# Patient Record
Sex: FEMALE | Race: WHITE | NOT HISPANIC OR LATINO | Employment: OTHER | URBAN - METROPOLITAN AREA
[De-identification: names, ages, dates, MRNs, and addresses within clinical notes are randomized per-mention and may not be internally consistent; named-entity substitution may affect disease eponyms.]

---

## 2020-08-27 ENCOUNTER — TRANSCRIBE ORDERS (OUTPATIENT)
Dept: ADMINISTRATIVE | Facility: HOSPITAL | Age: 65
End: 2020-08-27

## 2020-08-27 DIAGNOSIS — Z12.31 SCREENING MAMMOGRAM FOR HIGH-RISK PATIENT: Primary | ICD-10-CM

## 2020-09-02 ENCOUNTER — HOSPITAL ENCOUNTER (OUTPATIENT)
Dept: MAMMOGRAPHY | Facility: HOSPITAL | Age: 65
Discharge: HOME | End: 2020-09-02
Attending: SPECIALIST
Payer: COMMERCIAL

## 2020-09-02 VITALS — HEIGHT: 68 IN | BODY MASS INDEX: 21.98 KG/M2 | WEIGHT: 145 LBS

## 2020-09-02 DIAGNOSIS — Z12.31 SCREENING MAMMOGRAM FOR HIGH-RISK PATIENT: ICD-10-CM

## 2020-09-02 PROCEDURE — 77063 BREAST TOMOSYNTHESIS BI: CPT

## 2020-09-02 PROCEDURE — 77067 SCR MAMMO BI INCL CAD: CPT

## 2020-10-03 ENCOUNTER — OFFICE VISIT (OUTPATIENT)
Dept: URGENT CARE | Facility: CLINIC | Age: 65
End: 2020-10-03
Payer: COMMERCIAL

## 2020-10-03 VITALS
BODY MASS INDEX: 21.98 KG/M2 | RESPIRATION RATE: 16 BRPM | HEIGHT: 68 IN | HEART RATE: 79 BPM | SYSTOLIC BLOOD PRESSURE: 125 MMHG | WEIGHT: 145 LBS | DIASTOLIC BLOOD PRESSURE: 70 MMHG | OXYGEN SATURATION: 98 % | TEMPERATURE: 98.2 F

## 2020-10-03 DIAGNOSIS — T63.441A BEE STING, ACCIDENTAL OR UNINTENTIONAL, INITIAL ENCOUNTER: Primary | ICD-10-CM

## 2020-10-03 PROCEDURE — G0381 LEV 2 HOSP TYPE B ED VISIT: HCPCS | Performed by: PHYSICIAN ASSISTANT

## 2020-10-03 PROCEDURE — 99282 EMERGENCY DEPT VISIT SF MDM: CPT | Performed by: PHYSICIAN ASSISTANT

## 2020-10-03 RX ORDER — PREDNISONE 20 MG/1
40 TABLET ORAL DAILY
Qty: 6 TABLET | Refills: 0 | Status: SHIPPED | OUTPATIENT
Start: 2020-10-03 | End: 2020-10-06

## 2021-03-31 DIAGNOSIS — Z23 ENCOUNTER FOR IMMUNIZATION: ICD-10-CM

## 2021-07-27 ENCOUNTER — OFFICE VISIT (OUTPATIENT)
Dept: URGENT CARE | Facility: CLINIC | Age: 66
End: 2021-07-27
Payer: MEDICARE

## 2021-07-27 VITALS
RESPIRATION RATE: 16 BRPM | OXYGEN SATURATION: 99 % | SYSTOLIC BLOOD PRESSURE: 132 MMHG | DIASTOLIC BLOOD PRESSURE: 61 MMHG | HEART RATE: 65 BPM | TEMPERATURE: 97.4 F

## 2021-07-27 DIAGNOSIS — T63.444A BEE STING REACTION, UNDETERMINED INTENT, INITIAL ENCOUNTER: Primary | ICD-10-CM

## 2021-07-27 PROCEDURE — G0463 HOSPITAL OUTPT CLINIC VISIT: HCPCS | Performed by: PHYSICIAN ASSISTANT

## 2021-07-27 PROCEDURE — 99213 OFFICE O/P EST LOW 20 MIN: CPT | Performed by: PHYSICIAN ASSISTANT

## 2021-07-27 RX ORDER — CEPHALEXIN 500 MG/1
500 CAPSULE ORAL EVERY 6 HOURS SCHEDULED
Qty: 28 CAPSULE | Refills: 0 | Status: SHIPPED | OUTPATIENT
Start: 2021-07-27 | End: 2021-08-03

## 2021-07-27 NOTE — PATIENT INSTRUCTIONS
Insect Bite or Sting   WHAT YOU NEED TO KNOW:   Most insect bites and stings are not dangerous and go away without treatment  Your symptoms may be mild, or you may develop anaphylaxis  Anaphylaxis is a sudden, life-threatening reaction that needs immediate treatment  Common examples of insects that bite or sting are bees, ticks, mosquitoes, spiders, and ants  Insect bites or stings can lead to diseases such as malaria, West Nile virus, Lyme disease, or [place] Spotted Fever  DISCHARGE INSTRUCTIONS:   Call your local emergency number (911 in the [address] [facility]) for signs or symptoms of anaphylaxis,  such as trouble breathing, swelling in your mouth or throat, or wheezing  You may also have itching, a rash, hives, or feel like you are going to faint  Return to the emergency department if:   · You are stung on your tongue or in your throat  · A white area forms around the bite  · You are sweating badly or have body pain  · You think you were bitten or stung by a poisonous insect  Call your doctor if:   · You have a fever  · The area becomes red, warm, tender, and swollen beyond the area of the bite or sting  · You have questions or concerns about your condition or care  Medicines: You may need any of the following:  · Antihistamines  decrease itching and rash  · Epinephrine  is used to treat severe allergic reactions such as anaphylaxis  · Take your medicine as directed  Contact your healthcare provider if you think your medicine is not helping or if you have side effects  Tell him of her if you are allergic to any medicine  Keep a list of the medicines, vitamins, and herbs you take  Include the amounts, and when and why you take them  Bring the list or the pill bottles to follow-up visits  Carry your medicine list with you in case of an emergency  Steps to take for signs or symptoms of anaphylaxis:   · Immediately  give 1 shot of epinephrine only into the outer thigh muscle  · Leave the shot in place  as directed  Your healthcare provider may recommend you leave it in place for up to 10 seconds before you remove it  This helps make sure all of the epinephrine is delivered  · Call 911 and go to the emergency department,  even if the shot improved symptoms  Do not drive yourself  Bring the used epinephrine shot with you  Safety precautions to take if you are at risk for anaphylaxis:   · Keep 2 shots of epinephrine with you at all times  You may need a second shot, because epinephrine only works for about 20 minutes and symptoms may return  Your healthcare provider can show you and family members how to give the shot  Check the expiration date every month and replace it before it expires  · Create an action plan  Your healthcare provider can help you create a written plan that explains the allergy and an emergency plan to treat a reaction  The plan explains when to give a second epinephrine shot if symptoms return or do not improve after the first  Give copies of the action plan and emergency instructions to family members, work and school staff, and  providers  Show them how to give a shot of epinephrine  · Carry medical alert identification  Wear medical alert jewelry or carry a card that says you have an insect allergy  Ask your healthcare provider where to get these items  If an insect bites or stings you:   · Remove the stinger  Scrape the stinger out with your fingernail, edge of a credit card, or a knife blade  Do not squeeze the wound  Gently wash the area with soap and water  · Remove the tick  Ticks must be removed as soon as possible so you do not get diseases passed through tick bites  Ask your healthcare provider for more information on tick bites and how to remove ticks  Care for a bite or sting wound:   · Elevate (raise) the area above the level of your heart, if possible  Prop the area on pillows to keep it raised comfortably  Elevate the area for 10 to 20 minutes each hour or as directed by your healthcare provider  · Use compresses  Soak a clean washcloth in cold water, wring it out, and put it on the bite or sting  Use the compress for 10 to 20 minutes each hour or as directed by your healthcare provider  After 24 to 48 hours, change to warm compresses  · Apply a paste  Add water to baking soda to make a thick paste  Put the paste on the area for 5 minutes  Rinse gently to remove the paste  Prevent another insect bite or sting:   · Do not wear bright-colored or flower-print clothing when you plan to spend time outdoors  Do not use hairspray, perfumes, or aftershave  · Do not leave food out  · Empty any standing water and wash container with soap and water every 2 days  · Put screens on all open windows and doors  · Put insect repellent that contains DEET on skin that is showing when you go outside  Put insect repellent at the top of your boots, bottom of pant legs, and sleeve cuffs  Wear long sleeves, pants, and shoes  · Use citronella candles outdoors to help keep mosquitoes away  Put a tick and flea collar on pets  Follow up with your doctor as directed:  Write down your questions so you remember to ask them during your visits  © Copyright [employer] 2021 Information is for End User's use only and may not be sold, redistributed or otherwise used for commercial purposes  All illustrations and images included in CareNotes® are the copyrighted property of [employer]  or [facility]   The above information is an  only  It is not intended as medical advice for individual conditions or treatments  Talk to your doctor, nurse or pharmacist before following any medical regimen to see if it is safe and effective for you

## 2021-07-27 NOTE — PROGRESS NOTES
[facility]        NAME: Sheridan Gan is a 73 y o  female  : 1955    MRN: [id number]  DATE: 2021  TIME: 10:45 AM    Assessment and Plan   Bee sting reaction, undetermined intent, initial encounter [T63 [id number]]  1  Bee sting reaction, undetermined intent, initial encounter  cephalexin (KEFLEX) 500 mg capsule         Patient Instructions   Patient Instructions     Insect Bite or Sting   WHAT YOU NEED TO KNOW:   Most insect bites and stings are not dangerous and go away without treatment  Your symptoms may be mild, or you may develop anaphylaxis  Anaphylaxis is a sudden, life-threatening reaction that needs immediate treatment  Common examples of insects that bite or sting are bees, ticks, mosquitoes, spiders, and ants  Insect bites or stings can lead to diseases such as malaria, West Nile virus, Lyme disease, or [place] Spotted Fever  DISCHARGE INSTRUCTIONS:   Call your local emergency number (911 in the [address] [place] [address],[address]) for signs or symptoms of anaphylaxis,  such as trouble breathing, swelling in your mouth or throat, or wheezing  You may also have itching, a rash, hives, or feel like you are going to faint  Return to the emergency department if:   · You are stung on your tongue or in your throat  · A white area forms around the bite  · You are sweating badly or have body pain  · You think you were bitten or stung by a poisonous insect  Call your doctor if:   · You have a fever  · The area becomes red, warm, tender, and swollen beyond the area of the bite or sting  · You have questions or concerns about your condition or care  Medicines: You may need any of the following:  · Antihistamines  decrease itching and rash  · Epinephrine  is used to treat severe allergic reactions such as anaphylaxis  · Take your medicine as directed  Contact your healthcare provider if you think your medicine is not helping or if you have side effects   Tell him of her if you are allergic to any medicine  Keep a list of the medicines, vitamins, and herbs you take  Include the amounts, and when and why you take them  Bring the list or the pill bottles to follow-up visits  Carry your medicine list with you in case of an emergency  Steps to take for signs or symptoms of anaphylaxis:   · Immediately  give 1 shot of epinephrine only into the outer thigh muscle  · Leave the shot in place  as directed  Your healthcare provider may recommend you leave it in place for up to 10 seconds before you remove it  This helps make sure all of the epinephrine is delivered  · Call 911 and go to the emergency department,  even if the shot improved symptoms  Do not drive yourself  Bring the used epinephrine shot with you  Safety precautions to take if you are at risk for anaphylaxis:   · Keep 2 shots of epinephrine with you at all times  You may need a second shot, because epinephrine only works for about 20 minutes and symptoms may return  Your healthcare provider can show you and family members how to give the shot  Check the expiration date every month and replace it before it expires  · Create an action plan  Your healthcare provider can help you create a written plan that explains the allergy and an emergency plan to treat a reaction  The plan explains when to give a second epinephrine shot if symptoms return or do not improve after the first  Give copies of the action plan and emergency instructions to family members, work and school staff, and  providers  Show them how to give a shot of epinephrine  · Carry medical alert identification  Wear medical alert jewelry or carry a card that says you have an insect allergy  Ask your healthcare provider where to get these items  If an insect bites or stings you:   · Remove the stinger  Scrape the stinger out with your fingernail, edge of a credit card, or a knife blade  Do not squeeze the wound   Gently wash the area with soap and water     · Remove the tick  Ticks must be removed as soon as possible so you do not get diseases passed through tick bites  Ask your healthcare provider for more information on tick bites and how to remove ticks  Care for a bite or sting wound:   · Elevate (raise) the area above the level of your heart, if possible  Prop the area on pillows to keep it raised comfortably  Elevate the area for 10 to 20 minutes each hour or as directed by your healthcare provider  · Use compresses  Soak a clean washcloth in cold water, wring it out, and put it on the bite or sting  Use the compress for 10 to 20 minutes each hour or as directed by your healthcare provider  After 24 to 48 hours, change to warm compresses  · Apply a paste  Add water to baking soda to make a thick paste  Put the paste on the area for 5 minutes  Rinse gently to remove the paste  Prevent another insect bite or sting:   · Do not wear bright-colored or flower-print clothing when you plan to spend time outdoors  Do not use hairspray, perfumes, or aftershave  · Do not leave food out  · Empty any standing water and wash container with soap and water every 2 days  · Put screens on all open windows and doors  · Put insect repellent that contains DEET on skin that is showing when you go outside  Put insect repellent at the top of your boots, bottom of pant legs, and sleeve cuffs  Wear long sleeves, pants, and shoes  · Use citronella candles outdoors to help keep mosquitoes away  Put a tick and flea collar on pets  Follow up with your doctor as directed:  Write down your questions so you remember to ask them during your visits  © Copyright [employer] 2021 Information is for End User's use only and may not be sold, redistributed or otherwise used for commercial purposes   All illustrations and images included in CareNotes® are the copyrighted property of [employer]  or Chris Palacios  The above information is an  only  It is not intended as medical advice for individual conditions or treatments  Talk to your doctor, nurse or pharmacist before following any medical regimen to see if it is safe and effective for you  Follow up with PCP in 3-5 days  Proceed to  ER if symptoms worsen  Chief Complaint     Chief Complaint   Patient presents with   Sherill Button Sting     Sunday pt was stung by a bee on her right foot  History of Present Illness       -The patient states she was walking without shoes on Sunday when she stepped on a bee  -She was stung  In the right plantar foot  -She c/o increased pain, swelling, redness  -She denies associated fever or chills  -She denies bee allergy  -Denies facial swelling, lips swelling, tongue swelling, CP or SOB  -Had similar symptoms last October and needed Prednisone  -She took two Benadryl last night    -She states swelling and redness has  Progressively become worse  Review of Systems   Review of Systems   Constitutional: Negative for chills and fever  HENT: Positive for sore throat  Eyes: Negative for pain and visual disturbance  Respiratory: Negative for cough and shortness of breath  Cardiovascular: Negative for chest pain and palpitations  Gastrointestinal: Negative for abdominal pain and vomiting  Genitourinary: Negative for dysuria and hematuria  Musculoskeletal: Negative for arthralgias and back pain  Skin: Positive for color change  Negative for rash  Neurological: Negative for seizures and syncope  All other systems reviewed and are negative          Current Medications       Current Outpatient Medications:     cephalexin (KEFLEX) 500 mg capsule, Take 1 capsule (500 mg total) by mouth every 6 (six) hours for 7 days, Disp: 28 capsule, Rfl: 0    Current Allergies     Allergies as of 07/27/2021 - Reviewed 07/27/2021   Allergen Reaction Noted    Tetracycline Rash 09/02/2020            The following portions of the patient's history were reviewed and updated as appropriate: allergies, current medications, past family history, past medical history, past social history, past surgical history and problem list      Past Medical History:   Diagnosis Date    BRCA1 negative     BRCA2 negative        Past Surgical History:   Procedure Laterality Date    BREAST BIOPSY Bilateral benign 20yrs ago       Family History   Problem Relation Age of Onset    Breast cancer Mother 76    No Known Problems Maternal Grandmother     No Known Problems Paternal Grandmother     No Known Problems Maternal Aunt          Medications have been verified  Objective   /61   Pulse 65   Temp (!) 97 4 °F (36 3 °C)   Resp 16   SpO2 99%        Physical Exam     Physical Exam  Constitutional:       Appearance: She is well-developed  She is not diaphoretic  HENT:      Head: Normocephalic  Eyes:      General:         Right eye: No discharge  Left eye: No discharge  Pupils: Pupils are equal, round, and reactive to light  Neck:      Thyroid: No thyromegaly  Cardiovascular:      Rate and Rhythm: Normal rate  Heart sounds: No murmur heard  Pulmonary:      Effort: Pulmonary effort is normal  No respiratory distress  Breath sounds: No wheezing or rales  Chest:      Chest wall: No tenderness  Abdominal:      General: There is no distension  Palpations: Abdomen is soft  Tenderness: There is no abdominal tenderness  There is no guarding or rebound  Musculoskeletal:         General: Normal range of motion  Cervical back: Normal range of motion  Lymphadenopathy:      Cervical: No cervical adenopathy  Skin:     Comments: There is a bee sting injury noted right right plantar foot at the 1st metatarsal   There is associated ecchymosis and warmth  No streaking noted      Neurological:      Mental Status: She is alert and oriented to person, place, and time            -This is likely bee sting reaction with secondary infection   I suggest supportive tx including warm compresses and antihistamine

## 2021-11-01 ENCOUNTER — HOSPITAL ENCOUNTER (OUTPATIENT)
Dept: MAMMOGRAPHY | Facility: HOSPITAL | Age: 66
Discharge: HOME | End: 2021-11-01
Attending: SPECIALIST
Payer: MEDICARE

## 2021-11-01 ENCOUNTER — HOSPITAL ENCOUNTER (OUTPATIENT)
Dept: BONE DENSITY | Facility: HOSPITAL | Age: 66
Discharge: HOME | End: 2021-11-01
Attending: SPECIALIST
Payer: MEDICARE

## 2021-11-01 VITALS — HEIGHT: 68 IN | BODY MASS INDEX: 21.67 KG/M2 | WEIGHT: 143 LBS

## 2021-11-01 DIAGNOSIS — Z13.820 ENCOUNTER FOR SCREENING FOR OSTEOPOROSIS: ICD-10-CM

## 2021-11-01 DIAGNOSIS — Z12.31 ENCOUNTER FOR SCREENING MAMMOGRAM FOR MALIGNANT NEOPLASM OF BREAST: ICD-10-CM

## 2021-11-01 PROCEDURE — 77067 SCR MAMMO BI INCL CAD: CPT

## 2021-11-01 PROCEDURE — 77063 BREAST TOMOSYNTHESIS BI: CPT

## 2021-11-01 PROCEDURE — 77080 DXA BONE DENSITY AXIAL: CPT

## 2022-01-05 ENCOUNTER — APPOINTMENT (OUTPATIENT)
Dept: LAB | Facility: HOSPITAL | Age: 67
End: 2022-01-05
Attending: INTERNAL MEDICINE
Payer: MEDICARE

## 2022-01-05 DIAGNOSIS — R53.83 FATIGUE, UNSPECIFIED TYPE: ICD-10-CM

## 2022-01-05 DIAGNOSIS — R73.01 IMPAIRED FASTING GLUCOSE: ICD-10-CM

## 2022-01-05 DIAGNOSIS — E78.5 HYPERLIPIDEMIA, UNSPECIFIED HYPERLIPIDEMIA TYPE: ICD-10-CM

## 2022-01-05 DIAGNOSIS — E55.9 AVITAMINOSIS D: ICD-10-CM

## 2022-01-05 DIAGNOSIS — M25.50 PAIN IN JOINT, MULTIPLE SITES: ICD-10-CM

## 2022-01-05 DIAGNOSIS — M81.0 SENILE OSTEOPOROSIS: ICD-10-CM

## 2022-01-05 LAB
25(OH)D3 SERPL-MCNC: 21.2 NG/ML (ref 30–100)
ALBUMIN SERPL BCP-MCNC: 3.7 G/DL (ref 3.5–5)
ALP SERPL-CCNC: 81 U/L (ref 46–116)
ALT SERPL W P-5'-P-CCNC: 24 U/L (ref 12–78)
ANION GAP SERPL CALCULATED.3IONS-SCNC: 3 MMOL/L (ref 4–13)
AST SERPL W P-5'-P-CCNC: 15 U/L (ref 5–45)
BASOPHILS # BLD AUTO: 0.06 THOUSANDS/ΜL (ref 0–0.1)
BASOPHILS NFR BLD AUTO: 1 % (ref 0–1)
BILIRUB SERPL-MCNC: 0.81 MG/DL (ref 0.2–1)
BUN SERPL-MCNC: 17 MG/DL (ref 5–25)
CALCIUM SERPL-MCNC: 9.3 MG/DL (ref 8.3–10.1)
CHLORIDE SERPL-SCNC: 107 MMOL/L (ref 100–108)
CHOLEST SERPL-MCNC: 234 MG/DL
CO2 SERPL-SCNC: 30 MMOL/L (ref 21–32)
CREAT SERPL-MCNC: 0.66 MG/DL (ref 0.6–1.3)
EOSINOPHIL # BLD AUTO: 0.25 THOUSAND/ΜL (ref 0–0.61)
EOSINOPHIL NFR BLD AUTO: 3 % (ref 0–6)
ERYTHROCYTE [DISTWIDTH] IN BLOOD BY AUTOMATED COUNT: 12.2 % (ref 11.6–15.1)
EST. AVERAGE GLUCOSE BLD GHB EST-MCNC: 103 MG/DL
GFR SERPL CREATININE-BSD FRML MDRD: 92 ML/MIN/1.73SQ M
GLUCOSE P FAST SERPL-MCNC: 85 MG/DL (ref 65–99)
HBA1C MFR BLD: 5.2 %
HCT VFR BLD AUTO: 45.4 % (ref 34.8–46.1)
HDLC SERPL-MCNC: 99 MG/DL
HGB BLD-MCNC: 14.5 G/DL (ref 11.5–15.4)
IMM GRANULOCYTES # BLD AUTO: 0.03 THOUSAND/UL (ref 0–0.2)
IMM GRANULOCYTES NFR BLD AUTO: 0 % (ref 0–2)
LDLC SERPL CALC-MCNC: 120 MG/DL (ref 0–100)
LYMPHOCYTES # BLD AUTO: 1.92 THOUSANDS/ΜL (ref 0.6–4.47)
LYMPHOCYTES NFR BLD AUTO: 23 % (ref 14–44)
MCH RBC QN AUTO: 31.5 PG (ref 26.8–34.3)
MCHC RBC AUTO-ENTMCNC: 31.9 G/DL (ref 31.4–37.4)
MCV RBC AUTO: 99 FL (ref 82–98)
MONOCYTES # BLD AUTO: 0.62 THOUSAND/ΜL (ref 0.17–1.22)
MONOCYTES NFR BLD AUTO: 8 % (ref 4–12)
NEUTROPHILS # BLD AUTO: 5.43 THOUSANDS/ΜL (ref 1.85–7.62)
NEUTS SEG NFR BLD AUTO: 65 % (ref 43–75)
NONHDLC SERPL-MCNC: 135 MG/DL
NRBC BLD AUTO-RTO: 0 /100 WBCS
PLATELET # BLD AUTO: 310 THOUSANDS/UL (ref 149–390)
PMV BLD AUTO: 10.1 FL (ref 8.9–12.7)
POTASSIUM SERPL-SCNC: 3.9 MMOL/L (ref 3.5–5.3)
PROT SERPL-MCNC: 7.2 G/DL (ref 6.4–8.2)
RBC # BLD AUTO: 4.6 MILLION/UL (ref 3.81–5.12)
SODIUM SERPL-SCNC: 140 MMOL/L (ref 136–145)
TRIGL SERPL-MCNC: 75 MG/DL
TSH SERPL DL<=0.05 MIU/L-ACNC: 1.73 UIU/ML (ref 0.36–3.74)
WBC # BLD AUTO: 8.31 THOUSAND/UL (ref 4.31–10.16)

## 2022-01-05 PROCEDURE — 80061 LIPID PANEL: CPT

## 2022-01-05 PROCEDURE — 82306 VITAMIN D 25 HYDROXY: CPT

## 2022-01-05 PROCEDURE — 36415 COLL VENOUS BLD VENIPUNCTURE: CPT

## 2022-01-05 PROCEDURE — 84443 ASSAY THYROID STIM HORMONE: CPT

## 2022-01-05 PROCEDURE — 85025 COMPLETE CBC W/AUTO DIFF WBC: CPT

## 2022-01-05 PROCEDURE — 80053 COMPREHEN METABOLIC PANEL: CPT

## 2022-01-05 PROCEDURE — 83036 HEMOGLOBIN GLYCOSYLATED A1C: CPT

## 2022-01-11 ENCOUNTER — APPOINTMENT (OUTPATIENT)
Dept: LAB | Facility: HOSPITAL | Age: 67
End: 2022-01-11
Attending: SPECIALIST
Payer: MEDICARE

## 2022-01-11 DIAGNOSIS — R35.0 URINE FREQUENCY: ICD-10-CM

## 2022-01-11 PROCEDURE — 87086 URINE CULTURE/COLONY COUNT: CPT

## 2022-01-11 PROCEDURE — 87077 CULTURE AEROBIC IDENTIFY: CPT

## 2022-01-11 PROCEDURE — 87186 SC STD MICRODIL/AGAR DIL: CPT

## 2022-01-13 LAB
BACTERIA UR CULT: ABNORMAL
BACTERIA UR CULT: ABNORMAL

## 2022-01-26 ENCOUNTER — LAB (OUTPATIENT)
Dept: LAB | Facility: CLINIC | Age: 67
End: 2022-01-26
Payer: MEDICARE

## 2022-01-26 ENCOUNTER — CONSULT (OUTPATIENT)
Dept: GASTROENTEROLOGY | Facility: CLINIC | Age: 67
End: 2022-01-26
Payer: MEDICARE

## 2022-01-26 VITALS
SYSTOLIC BLOOD PRESSURE: 120 MMHG | RESPIRATION RATE: 16 BRPM | BODY MASS INDEX: 23.19 KG/M2 | HEART RATE: 73 BPM | HEIGHT: 68 IN | TEMPERATURE: 98.3 F | WEIGHT: 153 LBS | OXYGEN SATURATION: 96 % | DIASTOLIC BLOOD PRESSURE: 78 MMHG

## 2022-01-26 DIAGNOSIS — R19.5 POSITIVE COLORECTAL CANCER SCREENING USING COLOGUARD TEST: Primary | ICD-10-CM

## 2022-01-26 DIAGNOSIS — R71.8 ELEVATED MCV: ICD-10-CM

## 2022-01-26 DIAGNOSIS — K90.89 OTHER INTESTINAL MALABSORPTION: ICD-10-CM

## 2022-01-26 DIAGNOSIS — D52.9 ANEMIA DUE TO FOLIC ACID DEFICIENCY, UNSPECIFIED DEFICIENCY TYPE: ICD-10-CM

## 2022-01-26 DIAGNOSIS — Z86.19 HISTORY OF HEPATITIS C: ICD-10-CM

## 2022-01-26 DIAGNOSIS — R19.7 INTERMITTENT DIARRHEA: ICD-10-CM

## 2022-01-26 PROCEDURE — 82746 ASSAY OF FOLIC ACID SERUM: CPT

## 2022-01-26 PROCEDURE — 86364 TISS TRNSGLTMNASE EA IG CLAS: CPT

## 2022-01-26 PROCEDURE — 87522 HEPATITIS C REVRS TRNSCRPJ: CPT

## 2022-01-26 PROCEDURE — 86231 EMA EACH IG CLASS: CPT

## 2022-01-26 PROCEDURE — 82784 ASSAY IGA/IGD/IGG/IGM EACH: CPT

## 2022-01-26 PROCEDURE — 36415 COLL VENOUS BLD VENIPUNCTURE: CPT

## 2022-01-26 PROCEDURE — 99204 OFFICE O/P NEW MOD 45 MIN: CPT | Performed by: INTERNAL MEDICINE

## 2022-01-26 PROCEDURE — 86258 DGP ANTIBODY EACH IG CLASS: CPT

## 2022-01-26 PROCEDURE — 82607 VITAMIN B-12: CPT

## 2022-01-26 RX ORDER — CLINDAMYCIN AND BENZOYL PEROXIDE 10; 50 MG/G; MG/G
GEL TOPICAL 2 TIMES DAILY
COMMUNITY

## 2022-01-26 RX ORDER — ERGOCALCIFEROL 1.25 MG/1
CAPSULE ORAL
COMMUNITY
Start: 2022-01-06

## 2022-01-26 RX ORDER — IBANDRONATE SODIUM 150 MG/1
150 TABLET, FILM COATED ORAL
COMMUNITY
Start: 2021-11-01

## 2022-01-26 NOTE — PROGRESS NOTES
[facility]  Gastroenterology Outpatient Consultation  Patient Arsenio Romero   Age 73 y o  Gender female   MRN: [id number]  [facility] [id number]     ASSESSMENT AND PLAN:   Problem List Items Addressed This Visit        Digestive    History of hepatitis C     Elva Marcelino reports that she was noted to have hepatitis-C in the past   She also bleeds she was treated for this but cannot remember which treatment she took  She does not recall doing any injections on a weekly basis  She believes it was some form of a pill treatment however she reports this was about 12 years ago  I will check hepatitis C RNA quantitative test by PCR for completeness  Relevant Orders    Hepatitis C RNA, quantitative, PCR       Other    Positive colorectal cancer screening using Cologuard test - Primary     Elva Marcelino was noted to have a positive Cologuard  She believes she may have had 2 colonoscopies in her lifetime last 1 May have been over 10 years ago  She also believes she may have had colon polyps  She will be scheduled for colonoscopy  She will receive a Colyte preparation in split dose with the 2nd dose completed 3 hours prior to arrival   Two bisacodyl tablets  I explained to the patient the procedure of colonoscopy as well as its potential risks which are approximately 3 in 1000 chance of bleeding, infection, and perforation  Perforation may require a surgeon to repair it  I also explained the small but significant chance of missing lesions with colonoscopy which has been reported to be about 5-10%  Relevant Medications    polyethylene glycol (COLYTE) 4000 mL solution    Other Relevant Orders    Colonoscopy    Intermittent diarrhea     Elva Marcelino reports some intermittent abdominal cramping followed by diarrhea current maybe 1 time per month  This may related to underlying dietary intolerance or irritable bowel  I did suggest complete lactose-free diet for now  She should use Lactaid    Avoid all artificial sweeteners  Avoid beverages that contain high fructose corn syrup  Add fiber such as Benefiber 2 tsp fulls mixed in 6-8 oz non carbonated liquid daily  Check celiac antibody panel         Relevant Orders    Celiac Disease Antibody Profile    Elevated MCV     Check B12 and folate for completeness  Relevant Orders    Vitamin B12    Folate      Other Visit Diagnoses     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 acid deficiency, unspecified deficiency type         Relevant Orders    Vitamin B12    Other intestinal malabsorption         Relevant Orders    Folate         _____________________________________________________________    HPI:   Jaki Cabrera is a delightful 50-year-old woman who I am seeing in the office in consultation for positive Cologuard  I do not have the results as her gynecologist his outside the [facility] system who ordered this  We will request that result  Patient reports having had a couple colonoscopies in her lifetime  She believes the last colonoscopy was over 10 years ago  She also bleeds at some point she was noted to have colon polyps  She does report her bowel pattern is fairly regular for the most part however at times she will feel like she is incompletely evacuated have to have a couple bowel movements in a day to evacuate  At other times she may have some rectal urgency and diarrhea  This occurs less than 1 time per month  She thinks that dairy products may be a trigger and she believes she could have some underlying lactose intolerance  She does report some abdominal cramping preceding the bowel movements when this occurs  She does not use any artificial sweeteners  She does not drink soda  She was drinking up to about 6 cups of coffee per day but is cutting back  She denies any rectal bleeding  There has been no nocturnal stooling  She denies any melena  From an upper GI standpoint she does report having some heartburn indigestion from time to time    Spicy foods may trigger this  She may get some chest discomfort with this but after drinking soda and burping it typically goes away  She also use antacids  This occurs infrequently less than 1 time per month  There has been no dysphagia or early satiety  She denies any unintentional weight loss  She also reports having hepatitis-C  She believes she was treated for this about 12 years ago  She does not recall what treatment she received  Her dad may have had colon polyps  Dad also had peptic ulcer disease  Mom had breast cancer  She does not smoke tobacco   She may have 2 glasses of wine on the weekend  Allergies   Allergen Reactions    Tetracycline Rash     Current Outpatient Medications   Medication Sig Dispense Refill    clindamycin-benzoyl peroxide (BENZACLIN) gel Apply topically 2 (two) times a day      ergocalciferol (VITAMIN D2) 50,000 units TAKE 1 CAPSULE BY MOUTH 2 TIMES PER WEEK      ibandronate (BONIVA) 150 MG tablet Take 150 mg by mouth every 30 (thirty) days      polyethylene glycol (COLYTE) 4000 mL solution Take 4,000 mL by mouth once for 1 dose Take 4000 mL by mouth once for 1 dose  Use as directed 4000 mL 0     No current facility-administered medications for this visit       MEDICAL HISTORY:  Past Medical History:   Diagnosis Date    BRCA1 negative     BRCA2 negative     Hepatitis C      Past Surgical History:   Procedure Laterality Date    BREAST BIOPSY Bilateral benign 20yrs ago    COLONOSCOPY       Social History     Substance and Sexual Activity   Alcohol Use None     Social History     Substance and Sexual Activity   Drug Use Not on file     Social History     Tobacco Use   Smoking Status Former Smoker   Smokeless Tobacco Never Used     Family History   Problem Relation Age of Onset    Breast cancer Mother 76    No Known Problems Maternal Grandmother     No Known Problems Paternal Grandmother     No Known Problems Maternal Aunt        REVIEW OF SYSTEMS:  CONSTITUTIONAL: Denies any fever, chills, rigors, and weight loss  HEENT: No earache or tinnitus  Denies hearing loss or visual disturbances  CARDIOVASCULAR: No chest pain or palpitations  RESPIRATORY: Denies any cough, hemoptysis, shortness of breath or dyspnea on exertion  GASTROINTESTINAL: As noted in the History of Present Illness  GENITOURINARY: No problems with urination  Denies any hematuria or dysuria  NEUROLOGIC: No dizziness or vertigo, denies headaches  MUSCULOSKELETAL: Denies any muscle or joint pain  SKIN: Denies skin rashes or itching  ENDOCRINE: Denies excessive thirst  Denies intolerance to heat or cold  PSYCHOSOCIAL:  She does report some mild anxiety at times  Objective   Blood pressure 120/78, pulse 73, temperature 98 3 °F (36 8 °C), temperature source Tympanic, resp  rate 16, height 5' 8" (1 727 m), weight 69 4 kg (153 lb), SpO2 96 %  Body mass index is 23 26 kg/m²  PHYSICAL EXAM:   General Appearance: Alert, cooperative, no distress  HEENT: Normocephalic, atraumatic, anicteric  Neck: Supple, symmetrical, trachea midline  Lungs: Clear to auscultation bilaterally; no rales, rhonchi or wheezing; respirations unlabored   Heart: Regular rate and rhythm; no murmur, rub, or gallop  Abdomen: Soft, bowel sounds normal, non-tender, non-distended; no masses, there is no hepatosplenomegaly   No spider angiomas  Genitalia: Deferred   Rectal: Deferred   Extremities: No cyanosis, clubbing or edema   Skin: No jaundice, rashes, or lesions   Lymph nodes: No palpable cervical lymphadenopathy   Lab Results:   Appointment on 01/11/2022   Component Date Value    Urine Culture 01/11/2022 >100,000 cfu/ml Escherichia coli*    Urine Culture 01/11/2022 50,000-59,000 cfu/ml - [facility] Gram-positive fritz*   Transcribe Orders on 01/11/2022   Component Date Value    Clarity, UA 01/11/2022 Clear     Color, UA 01/11/2022 Yellow     Specific Gravity, UA 01/11/2022 1 020     pH, UA 01/11/2022 6 5     Glucose, UA 01/11/2022 Negative     Ketones, UA 01/11/2022 Negative     Blood, UA 01/11/2022 Small     Protein, UA 01/11/2022 Negative     Nitrite, UA 01/11/2022 Negative     Bilirubin, UA 01/11/2022 Negative     Urobilinogen, UA 01/11/2022 0 2     Leukocytes, UA 01/11/2022 Large*    WBC, UA 01/11/2022 Innumerable*    RBC, UA 01/11/2022 10-20*    Bacteria, UA 01/11/2022 Occasional     Epithelial Cells 01/11/2022 Occasional    Appointment on 01/05/2022   Component Date Value    WBC 01/05/2022 8 31     RBC 01/05/2022 4 60     Hemoglobin 01/05/2022 14 5     Hematocrit 01/05/2022 45 4     MCV 01/05/2022 99*    MCH 01/05/2022 31 5     MCHC 01/05/2022 31 9     RDW 01/05/2022 12 2     MPV 01/05/2022 10 1     Platelets 57/97/0226 310     nRBC 01/05/2022 0     Neutrophils Relative 01/05/2022 65     Immat GRANS % 01/05/2022 0     Lymphocytes Relative 01/05/2022 23     Monocytes Relative 01/05/2022 8     Eosinophils Relative 01/05/2022 3     Basophils Relative 01/05/2022 1     Neutrophils Absolute 01/05/2022 5 43     Immature Grans Absolute 01/05/2022 0 03     Lymphocytes Absolute 01/05/2022 1 92     Monocytes Absolute 01/05/2022 0 62     Eosinophils Absolute 01/05/2022 0 25     Basophils Absolute 01/05/2022 0 06     Sodium 01/05/2022 140     Potassium 01/05/2022 3 9     Chloride 01/05/2022 107     CO2 01/05/2022 30     ANION GAP 01/05/2022 3*    BUN 01/05/2022 17     Creatinine 01/05/2022 0 66     Glucose, Fasting 01/05/2022 85     Calcium 01/05/2022 9 3     AST 01/05/2022 15     ALT 01/05/2022 24     Alkaline Phosphatase 01/05/2022 81     Total Protein 01/05/2022 7 2     Albumin 01/05/2022 3 7     Total Bilirubin 01/05/2022 0 81     eGFR 01/05/2022 92     Hemoglobin A1C 01/05/2022 5 2     EAG 01/05/2022 103     Cholesterol 01/05/2022 234*    Triglycerides 01/05/2022 75     HDL, Direct 01/05/2022 99     LDL Calculated 01/05/2022 120*    Non-HDL-Chol (CHOL-HDL) 01/05/2022 135     TSH 3RD GENERATON 01/05/2022 1 730     Vit D, 25-Hydroxy 01/05/2022 21 2*     Radiology Results:   No results found    Joseph Cole DO   01/26/22   Cc:

## 2022-01-26 NOTE — ASSESSMENT & PLAN NOTE
Briana Thrasher was noted to have a positive Cologuard  She believes she may have had 2 colonoscopies in her lifetime last 1 May have been over 10 years ago  She also believes she may have had colon polyps  She will be scheduled for colonoscopy  She will receive a Colyte preparation in split dose with the 2nd dose completed 3 hours prior to arrival   Two bisacodyl tablets  I explained to the patient the procedure of colonoscopy as well as its potential risks which are approximately 3 in 1000 chance of bleeding, infection, and perforation  Perforation may require a surgeon to repair it  I also explained the small but significant chance of missing lesions with colonoscopy which has been reported to be about 5-10%

## 2022-01-26 NOTE — PATIENT INSTRUCTIONS
Positive colorectal cancer screening using Cologuard test  Randal Rojo was noted to have a positive Cologuard  She believes she may have had 2 colonoscopies in her lifetime last 1 May have been over 10 years ago  She also believes she may have had colon polyps  She will be scheduled for colonoscopy  She will receive a Colyte preparation in split dose with the 2nd dose completed 3 hours prior to arrival   Two bisacodyl tablets  I explained to the patient the procedure of colonoscopy as well as its potential risks which are approximately 3 in 1000 chance of bleeding, infection, and perforation  Perforation may require a surgeon to repair it  I also explained the small but significant chance of missing lesions with colonoscopy which has been reported to be about 5-10%  History of hepatitis C  Randal Rojo reports that she was noted to have hepatitis-C in the past   She also bleeds she was treated for this but cannot remember which treatment she took  She does not recall doing any injections on a weekly basis  She believes it was some form of a pill treatment however she reports this was about 12 years ago  I will check hepatitis C RNA quantitative test by PCR for completeness  Intermittent diarrhea  Randal Rojo reports some intermittent abdominal cramping followed by diarrhea current maybe 1 time per month  This may related to underlying dietary intolerance or irritable bowel  I did suggest complete lactose-free diet for now  She should use Lactaid  Avoid all artificial sweeteners  Avoid beverages that contain high fructose corn syrup  Add fiber such as Benefiber 2 tsp fulls mixed in 6-8 oz non carbonated liquid daily  Check celiac antibody panel    Elevated MCV  Check B12 and folate for completeness      Scheduled date of colonoscopy (as of today):3/10/22  Physician performing colonoscopy:Minissale  Location of colonoscopy:[place]  Bowel prep reviewed with patient:Noemi  Instructions reviewed with patient by:Marilu  Clearances: none

## 2022-01-26 NOTE — ASSESSMENT & PLAN NOTE
Yanira Munoz reports some intermittent abdominal cramping followed by diarrhea current maybe 1 time per month  This may related to underlying dietary intolerance or irritable bowel  I did suggest complete lactose-free diet for now  She should use Lactaid  Avoid all artificial sweeteners  Avoid beverages that contain high fructose corn syrup  Add fiber such as Benefiber 2 tsp fulls mixed in 6-8 oz non carbonated liquid daily    Check celiac antibody panel

## 2022-01-27 LAB
ENDOMYSIUM IGA SER QL: NEGATIVE
FOLATE SERPL-MCNC: >20 NG/ML (ref 3.1–17.5)
GLIADIN PEPTIDE IGA SER-ACNC: 5 UNITS (ref 0–19)
GLIADIN PEPTIDE IGG SER-ACNC: 1 UNITS (ref 0–19)
HCV RNA SERPL NAA+PROBE-ACNC: NORMAL IU/ML
IGA SERPL-MCNC: 297 MG/DL (ref 87–352)
TEST INFORMATION: NORMAL
TTG IGA SER-ACNC: <2 U/ML (ref 0–3)
TTG IGG SER-ACNC: 5 U/ML (ref 0–5)
VIT B12 SERPL-MCNC: 371 PG/ML (ref 100–900)

## 2022-01-29 NOTE — RESULT ENCOUNTER NOTE
Please inform patient that Vit B12 and folate are OK  Blood test for Celiac disease negative   Thanks

## 2022-03-09 ENCOUNTER — ANESTHESIA EVENT (OUTPATIENT)
Dept: ANESTHESIOLOGY | Facility: HOSPITAL | Age: 67
End: 2022-03-09

## 2022-03-09 ENCOUNTER — ANESTHESIA (OUTPATIENT)
Dept: ANESTHESIOLOGY | Facility: HOSPITAL | Age: 67
End: 2022-03-09

## 2022-03-09 RX ORDER — SODIUM CHLORIDE, SODIUM LACTATE, POTASSIUM CHLORIDE, CALCIUM CHLORIDE 600; 310; 30; 20 MG/100ML; MG/100ML; MG/100ML; MG/100ML
50 INJECTION, SOLUTION INTRAVENOUS CONTINUOUS
Status: CANCELLED | OUTPATIENT
Start: 2022-03-09

## 2022-03-10 ENCOUNTER — ANESTHESIA (OUTPATIENT)
Dept: GASTROENTEROLOGY | Facility: HOSPITAL | Age: 67
End: 2022-03-10

## 2022-03-10 ENCOUNTER — ANESTHESIA EVENT (OUTPATIENT)
Dept: GASTROENTEROLOGY | Facility: HOSPITAL | Age: 67
End: 2022-03-10

## 2022-03-10 ENCOUNTER — HOSPITAL ENCOUNTER (OUTPATIENT)
Dept: GASTROENTEROLOGY | Facility: HOSPITAL | Age: 67
Setting detail: OUTPATIENT SURGERY
Discharge: HOME | End: 2022-03-10
Attending: INTERNAL MEDICINE | Admitting: INTERNAL MEDICINE
Payer: MEDICARE

## 2022-03-10 VITALS
SYSTOLIC BLOOD PRESSURE: 104 MMHG | OXYGEN SATURATION: 99 % | HEART RATE: 71 BPM | DIASTOLIC BLOOD PRESSURE: 58 MMHG | RESPIRATION RATE: 18 BRPM | TEMPERATURE: 97.2 F

## 2022-03-10 DIAGNOSIS — R19.5 POSITIVE COLORECTAL CANCER SCREENING USING COLOGUARD TEST: ICD-10-CM

## 2022-03-10 PROCEDURE — 45385 COLONOSCOPY W/LESION REMOVAL: CPT | Performed by: INTERNAL MEDICINE

## 2022-03-10 PROCEDURE — 88305 TISSUE EXAM BY PATHOLOGIST: CPT | Performed by: PATHOLOGY

## 2022-03-10 RX ORDER — PROPOFOL 10 MG/ML
INJECTION, EMULSION INTRAVENOUS CONTINUOUS PRN
Status: DISCONTINUED | OUTPATIENT
Start: 2022-03-10 | End: 2022-03-10

## 2022-03-10 RX ORDER — SODIUM CHLORIDE, SODIUM LACTATE, POTASSIUM CHLORIDE, CALCIUM CHLORIDE 600; 310; 30; 20 MG/100ML; MG/100ML; MG/100ML; MG/100ML
20 INJECTION, SOLUTION INTRAVENOUS CONTINUOUS
Status: DISCONTINUED | OUTPATIENT
Start: 2022-03-10 | End: 2022-03-14 | Stop reason: HOSPADM

## 2022-03-10 RX ORDER — SODIUM CHLORIDE, SODIUM LACTATE, POTASSIUM CHLORIDE, CALCIUM CHLORIDE 600; 310; 30; 20 MG/100ML; MG/100ML; MG/100ML; MG/100ML
INJECTION, SOLUTION INTRAVENOUS CONTINUOUS PRN
Status: DISCONTINUED | OUTPATIENT
Start: 2022-03-10 | End: 2022-03-10

## 2022-03-10 RX ORDER — PROPOFOL 10 MG/ML
INJECTION, EMULSION INTRAVENOUS AS NEEDED
Status: DISCONTINUED | OUTPATIENT
Start: 2022-03-10 | End: 2022-03-10

## 2022-03-10 RX ADMIN — SODIUM CHLORIDE, SODIUM LACTATE, POTASSIUM CHLORIDE, AND CALCIUM CHLORIDE 20 ML/HR: .6; .31; .03; .02 INJECTION, SOLUTION INTRAVENOUS at 12:21

## 2022-03-10 RX ADMIN — SODIUM CHLORIDE, SODIUM LACTATE, POTASSIUM CHLORIDE, AND CALCIUM CHLORIDE: .6; .31; .03; .02 INJECTION, SOLUTION INTRAVENOUS at 13:02

## 2022-03-10 RX ADMIN — PROPOFOL 100 MCG/KG/MIN: 10 INJECTION, EMULSION INTRAVENOUS at 13:08

## 2022-03-10 RX ADMIN — PROPOFOL 150 MG: 10 INJECTION, EMULSION INTRAVENOUS at 13:08

## 2022-03-10 NOTE — ANESTHESIA POSTPROCEDURE EVALUATION
Post-Op Assessment Note    CV Status:  Stable  Pain Score: 0    Pain management: adequate     Mental Status:  Alert and awake   Hydration Status:  Euvolemic   PONV Controlled:  Controlled   Airway Patency:  Patent      Post Op Vitals Reviewed: Yes      Staff: CRNA         No complications documented      /58 (03/10/22 1354)    Temp (!) 97 2 °F (36 2 °C) (03/10/22 1354)    Pulse 71 (03/10/22 1354)   Resp 18 (03/10/22 1354)    SpO2 99 % (03/10/22 1354)

## 2022-03-10 NOTE — DISCHARGE INSTRUCTIONS
Colonoscopy   WHAT YOU NEED TO KNOW:   A colonoscopy is a procedure to examine the inside of your colon (intestine) with a scope  Polyps or tissue growths may have been removed during your colonoscopy  It is normal to feel bloated and to have some abdominal discomfort  You should be passing gas  If you have hemorrhoids or you had polyps removed, you may have a small amount of bleeding  DISCHARGE INSTRUCTIONS:   Seek care immediately if:    You have sudden, severe abdominal pain   You have problems swallowing   You have a large amount of black, sticky bowel movements or blood in your bowel movements   You have sudden trouble breathing   You feel weak, lightheaded, or faint or your heart beats faster than normal for you  Contact your healthcare provider if:    You have a fever and chills   You have nausea or are vomiting   Your abdomen is bloated or feels full and hard   You have abdominal pain   You have black, sticky bowel movements or blood in your bowel movements   You have not had a bowel movement for 3 days after your procedure   You have rash or hives   You have questions or concerns about your procedure  Activity:    Do not lift, strain, or run for 24 hours after your procedure   Rest after your procedure  You have been given medicine to relax you  Do not drive or make important decisions until the day after your procedure  Return to your normal activity as directed   Relieve gas and discomfort from bloating by lying on your right side with a heating pad on your abdomen  You may need to take short walks to help the gas move out  Eat small meals until bloating is relieved  Follow up with your healthcare provider as directed: Write down your questions so you remember to ask them during your visits  If you take a blood thinner, please review the specific instructions from your endoscopist about when you should resume it   These can be found in the Recommendation and Your Medication list sections of this After Visit Summary  No

## 2022-03-10 NOTE — ANESTHESIA PREPROCEDURE EVALUATION
Procedure:  COLONOSCOPY    Relevant Problems   NEURO/PSYCH   (+) History of hepatitis C        Physical Exam    Airway    Mallampati score: II  TM Distance: >3 FB  Neck ROM: full     Dental   No notable dental hx     Cardiovascular      Pulmonary      Other Findings        Anesthesia Plan  ASA Score- 2     Anesthesia Type- IV sedation with anesthesia with ASA Monitors  Additional Monitors:   Airway Plan:           Plan Factors-Exercise tolerance (METS): >4 METS  Chart reviewed  Existing labs reviewed  Patient summary reviewed  Patient is not a current smoker  Induction-     Postoperative Plan-     Informed Consent- Anesthetic plan and risks discussed with patient  I personally reviewed this patient with the CRNA  Discussed and agreed on the Anesthesia Plan with the CRNA  Nell Waterman

## 2022-03-10 NOTE — H&P
History and Physical - [facility]  Mahamed Beavers 73 y o  female MRN: [id number]                  HPI: Mahamed Beavers is a 73y o  year old female who presents for colonoscopy due to a positive Cologuard  She was seen in the GI office by me on January 26  Please refer to office note from Juan Rodney regarding specifics of her medical history  Does report intermittent diarrhea  She was noted to have an elevated MCV  Her last colonoscopy was over 10 years ago and I do not have the report  Bowel habits have been much better since she has been trying to avoid dairy products  She has also been using fiber on a more regular basis  Denies any rectal bleeding or melena  Dad may have had colon polyps  Dad did have a history of peptic ulcer disease  Patient denies any tobacco use  She may have a couple glasses of wine on the weekend  Workup included negative celiac antibody panel  Vitamin B12 was 371 folate was normal hepatitis-C RNA was negative she was previously treated for hepatitis-C         REVIEW OF SYSTEMS: Per the HPI, and otherwise unremarkable      Historical Information   Past Medical History:   Diagnosis Date    BRCA1 negative     BRCA2 negative     Hepatitis C      Past Surgical History:   Procedure Laterality Date    BREAST BIOPSY Bilateral benign 20yrs ago    COLONOSCOPY       Social History   Social History     Substance and Sexual Activity   Alcohol Use Yes    Comment: social     Social History     Substance and Sexual Activity   Drug Use Never     Social History     Tobacco Use   Smoking Status Former Smoker   Smokeless Tobacco Never Used     Family History   Problem Relation Age of Onset    Breast cancer Mother 76    No Known Problems Maternal Grandmother     No Known Problems Paternal Grandmother     No Known Problems Maternal Aunt        Meds/Allergies       Current Outpatient Medications:     polyethylene glycol (COLYTE) 4000 mL solution   clindamycin-benzoyl peroxide (BENZACLIN) gel    ergocalciferol (VITAMIN D2) 50,000 units    ibandronate (BONIVA) 150 MG tablet    Current Facility-Administered Medications:     lactated ringers infusion, 20 mL/hr, Intravenous, Continuous, 20 mL/hr at 03/10/22 1221    Allergies   Allergen Reactions    Tetracycline Rash       Objective     /61   Pulse 83   Temp 98 °F (36 7 °C) (Temporal)   Resp 18   SpO2 98%       PHYSICAL EXAM    Gen: NAD  Head: NCAT  CV: RRR  CHEST: Clear  ABD: soft, NT/ND  EXT: no edema      ASSESSMENT/PLAN:  This is a 73y o  year old female here for colonoscopy, and she is stable and optimized for her procedure

## 2022-03-18 NOTE — RESULT ENCOUNTER NOTE
Please inform patient  that colon polyps were all benign, 4 were growing types of polyps (pre  cancerous/tubular adenomas ) that were completely removed  Recommend repeating colonoscopy in 1 year due to multiple   multiple colon polyps and 1 polyp over 1 cm    Thank you

## 2022-04-14 NOTE — ASSESSMENT & PLAN NOTE
Garcia Araujo reports that she was noted to have hepatitis-C in the past   She also bleeds she was treated for this but cannot remember which treatment she took  She does not recall doing any injections on a weekly basis  She believes it was some form of a pill treatment however she reports this was about 12 years ago  I will check hepatitis C RNA quantitative test by PCR for completeness  No

## 2022-07-29 ENCOUNTER — APPOINTMENT (OUTPATIENT)
Dept: LAB | Facility: CLINIC | Age: 67
End: 2022-07-29
Payer: MEDICARE

## 2022-07-29 DIAGNOSIS — E55.9 VITAMIN D DEFICIENCY: ICD-10-CM

## 2022-07-29 LAB — 25(OH)D3 SERPL-MCNC: 37 NG/ML (ref 30–100)

## 2022-07-29 PROCEDURE — 82306 VITAMIN D 25 HYDROXY: CPT

## 2022-07-29 PROCEDURE — 36415 COLL VENOUS BLD VENIPUNCTURE: CPT

## 2022-10-27 ENCOUNTER — APPOINTMENT (OUTPATIENT)
Dept: LAB | Facility: CLINIC | Age: 67
End: 2022-10-27
Payer: MEDICARE

## 2022-10-27 DIAGNOSIS — E55.9 VITAMIN D DEFICIENCY: ICD-10-CM

## 2022-10-27 LAB — 25(OH)D3 SERPL-MCNC: 87.5 NG/ML (ref 30–100)

## 2022-10-27 PROCEDURE — 36415 COLL VENOUS BLD VENIPUNCTURE: CPT

## 2022-10-27 PROCEDURE — 82306 VITAMIN D 25 HYDROXY: CPT

## 2022-11-10 ENCOUNTER — HOSPITAL ENCOUNTER (OUTPATIENT)
Dept: BONE DENSITY | Facility: HOSPITAL | Age: 67
End: 2022-11-10
Attending: SPECIALIST

## 2022-11-10 ENCOUNTER — APPOINTMENT (OUTPATIENT)
Dept: RADIOLOGY | Facility: CLINIC | Age: 67
End: 2022-11-10

## 2022-11-10 ENCOUNTER — HOSPITAL ENCOUNTER (OUTPATIENT)
Dept: MAMMOGRAPHY | Facility: HOSPITAL | Age: 67
End: 2022-11-10
Attending: SPECIALIST

## 2022-11-10 VITALS — HEIGHT: 69 IN | WEIGHT: 153 LBS | BODY MASS INDEX: 22.66 KG/M2

## 2022-11-10 VITALS — BODY MASS INDEX: 22.66 KG/M2 | HEIGHT: 69 IN | WEIGHT: 153 LBS

## 2022-11-10 DIAGNOSIS — R05.9 COUGH, UNSPECIFIED TYPE: ICD-10-CM

## 2022-11-10 DIAGNOSIS — Z12.31 ENCOUNTER FOR SCREENING MAMMOGRAM FOR MALIGNANT NEOPLASM OF BREAST: ICD-10-CM

## 2023-02-14 ENCOUNTER — APPOINTMENT (OUTPATIENT)
Dept: LAB | Facility: MEDICAL CENTER | Age: 68
End: 2023-02-14

## 2023-02-14 DIAGNOSIS — R63.5 WEIGHT GAIN: ICD-10-CM

## 2023-02-14 DIAGNOSIS — R53.83 OTHER FATIGUE: ICD-10-CM

## 2023-02-14 DIAGNOSIS — R73.9 ELEVATED BLOOD SUGAR: ICD-10-CM

## 2023-02-14 DIAGNOSIS — E55.9 VITAMIN D DEFICIENCY: ICD-10-CM

## 2023-02-14 DIAGNOSIS — E78.5 HYPERLIPIDEMIA, UNSPECIFIED HYPERLIPIDEMIA TYPE: ICD-10-CM

## 2023-02-14 DIAGNOSIS — M25.50 ARTHRALGIA, UNSPECIFIED JOINT: ICD-10-CM

## 2023-02-14 LAB
25(OH)D3 SERPL-MCNC: 41.7 NG/ML (ref 30–100)
ALBUMIN SERPL BCP-MCNC: 3.9 G/DL (ref 3.5–5)
ALP SERPL-CCNC: 70 U/L (ref 46–116)
ALT SERPL W P-5'-P-CCNC: 21 U/L (ref 12–78)
ANION GAP SERPL CALCULATED.3IONS-SCNC: 0 MMOL/L (ref 4–13)
AST SERPL W P-5'-P-CCNC: 16 U/L (ref 5–45)
BASOPHILS # BLD AUTO: 0.06 THOUSANDS/ÂΜL (ref 0–0.1)
BASOPHILS NFR BLD AUTO: 1 % (ref 0–1)
BILIRUB SERPL-MCNC: 0.45 MG/DL (ref 0.2–1)
BUN SERPL-MCNC: 12 MG/DL (ref 5–25)
CALCIUM SERPL-MCNC: 9.8 MG/DL (ref 8.3–10.1)
CHLORIDE SERPL-SCNC: 111 MMOL/L (ref 96–108)
CHOLEST SERPL-MCNC: 200 MG/DL
CO2 SERPL-SCNC: 30 MMOL/L (ref 21–32)
CREAT SERPL-MCNC: 0.63 MG/DL (ref 0.6–1.3)
EOSINOPHIL # BLD AUTO: 0.11 THOUSAND/ÂΜL (ref 0–0.61)
EOSINOPHIL NFR BLD AUTO: 2 % (ref 0–6)
ERYTHROCYTE [DISTWIDTH] IN BLOOD BY AUTOMATED COUNT: 12.1 % (ref 11.6–15.1)
EST. AVERAGE GLUCOSE BLD GHB EST-MCNC: 100 MG/DL
GFR SERPL CREATININE-BSD FRML MDRD: 93 ML/MIN/1.73SQ M
GLUCOSE SERPL-MCNC: 93 MG/DL (ref 65–140)
HBA1C MFR BLD: 5.1 %
HCT VFR BLD AUTO: 45.4 % (ref 34.8–46.1)
HDLC SERPL-MCNC: 82 MG/DL
HGB BLD-MCNC: 14.9 G/DL (ref 11.5–15.4)
IMM GRANULOCYTES # BLD AUTO: 0.01 THOUSAND/UL (ref 0–0.2)
IMM GRANULOCYTES NFR BLD AUTO: 0 % (ref 0–2)
LDLC SERPL CALC-MCNC: 88 MG/DL (ref 0–100)
LYMPHOCYTES # BLD AUTO: 1.76 THOUSANDS/ÂΜL (ref 0.6–4.47)
LYMPHOCYTES NFR BLD AUTO: 29 % (ref 14–44)
MCH RBC QN AUTO: 31.6 PG (ref 26.8–34.3)
MCHC RBC AUTO-ENTMCNC: 32.8 G/DL (ref 31.4–37.4)
MCV RBC AUTO: 96 FL (ref 82–98)
MONOCYTES # BLD AUTO: 0.39 THOUSAND/ÂΜL (ref 0.17–1.22)
MONOCYTES NFR BLD AUTO: 6 % (ref 4–12)
NEUTROPHILS # BLD AUTO: 3.73 THOUSANDS/ÂΜL (ref 1.85–7.62)
NEUTS SEG NFR BLD AUTO: 62 % (ref 43–75)
NONHDLC SERPL-MCNC: 118 MG/DL
NRBC BLD AUTO-RTO: 0 /100 WBCS
PLATELET # BLD AUTO: 328 THOUSANDS/UL (ref 149–390)
PMV BLD AUTO: 10.1 FL (ref 8.9–12.7)
POTASSIUM SERPL-SCNC: 4 MMOL/L (ref 3.5–5.3)
PROT SERPL-MCNC: 7.4 G/DL (ref 6.4–8.4)
RBC # BLD AUTO: 4.71 MILLION/UL (ref 3.81–5.12)
SODIUM SERPL-SCNC: 141 MMOL/L (ref 135–147)
T4 FREE SERPL-MCNC: 1.04 NG/DL (ref 0.76–1.46)
TRIGL SERPL-MCNC: 152 MG/DL
TSH SERPL DL<=0.05 MIU/L-ACNC: 1.09 UIU/ML (ref 0.45–4.5)
WBC # BLD AUTO: 6.06 THOUSAND/UL (ref 4.31–10.16)

## 2023-07-12 NOTE — PROGRESS NOTES
[facility] Gastroenterology  Gastroenterology Outpatient Consultation  Patient Heron Aleman   Age 78 y.o. Gender female   MRN: [id number]  [facility] [id number]     ASSESSMENT AND PLAN:   Problem List Items Addressed This Visit        Digestive    GERD (gastroesophageal reflux disease)     Please see comments under chest pressure. Is quite possible patient's symptoms are related to nocturnal reflux. Relevant Medications    omeprazole (PriLOSEC) 20 mg delayed release capsule    famotidine (PEPCID) 40 MG tablet       Other    Intermittent diarrhea     Chinyere Weber continues to experience some intermittent diarrhea. The exact cause for this is not clear. I suspect underlying dietary intolerance. I did encourage her to try to use her fiber supplement on a more regular basis. She is currently using Metamucil. She should try to take this on a daily basis. Check fecal elastase  Check qualitative fecal fat  Celiac antibody panel was negative previously  Try complete lactose-free diet for about 1 month to see if this improves symptoms. If symptoms persist could consider breath testing to evaluate for small intestinal bacterial overgrowth or empiric treatment. Relevant Orders    Hepatic function panel    Fecal fat, qualitative    Pancreatic elastase, fecal    C-reactive protein    Chest pressure - Primary     Patient reports having chest pressure waking her from sleep couple nights a week radiating through to her back into her jaw. There is no associated shortness of breath. This tends to get better after taking a few Tums and drinking some soda. Given that she is 78years of age and has atypical chest discomfort I would recommend cardiac evaluation. However I suspect this is more related to acid. It is prudent to rule out a cardiac etiology. For now I will start her on omeprazole 20 mg daily best to take 30 minutes to 1 hour before 1 meal a day. Add famotidine 40 mg 2 hours before bed.   Lifestyle modifications for gastroesophageal reflux disease were discussed and include limiting fried and fatty foods, mints, chocolates, carbonated and caffeinated beverages , and alcohol, etc.  Avoid lying down for 2-3 hours after meals. If you have nighttime symptoms consider raising the head of the bed up on 4-6 inch blocks. Pillows typically are not useful. If you are overweight, weight loss will be helpful. Given her atypical symptoms, if cardiac evaluation is unremarkable I would pursue upper endoscopy. I explained to the patient the procedure of upper endoscopy as well as its potential risk which are approximately 1 in 1000 chance of bleeding, infection, and perforation. Relevant Medications    omeprazole (PriLOSEC) 20 mg delayed release capsule    famotidine (PEPCID) 40 MG tablet    Other Relevant Orders    EGD    Ambulatory Referral to Cardiology    Epigastric discomfort     Elvin Mitchell does report epigastric discomfort about twice a month. There is no associated nausea or vomiting. May last about 10 minutes. She thinks this is food related. Whether this is reflux related, acid peptic related, or gallbladder is not clear. Check ultrasound of the abdomen  Check liver profile  She will be scheduled for upper endoscopy to further evaluate this as well. Symptoms are atypical for gallbladder disease. Limit NSAID use         Relevant Orders    EGD    US abdomen complete    Hepatic function panel    History of colon polyps     Patient was noted to have 4 mm polyp distal rectum status post cold snare polypectomy. Tubular adenoma  12 mm semipedunculated polyp hepatic flexure status post hot snare polypectomy. Tubular adenoma  8 mm polyp mid ascending colon status post cold snare polypectomy. Tubular adenoma  6 mm flat polyp distal ascending colon status post cold snare polypectomy. Tubular adenoma  Based upon these findings I would recommend repeating a colonoscopy in March 2025. _____________________________________________________________    HPI:   Elvin Mitchell is a delightful 71-year-old woman home seen in the office in consultation for atypical chest discomfort and dyspeptic symptoms. Patient reports that about 6 months ago she noticed that she was having fairly significant indigestion typically at night when she would lie down. She describes a pressure feeling in the middle the night in her mid chest.  This will radiate through to her back into her jaw. She will get up and take Tums and sip some soda and over time this symptom will go away. She does notice a little of burning when this occurs as well. This may occur also when she eats late at night. She has not noticed any chest burning or jaw pain with exertion. There is no shortness of breath when this occurs. Denies any dysphagia. Other than Tums she has not tried taking of the medication for this. She has had some weight gain recently. She does report some epigastric abdominal discomfort occurring about twice a month. There is no associated nausea or vomiting. This may only last about 10 minutes but will come and go. This does not radiate through to her back. This may be food related. She may take an occasional Aleve but not on a regular basis. Her bowel habits are similar to what they were back in January 2022. She does report intermittent loose bowel movements. There is been no rectal bleeding. She is afraid to eat prior to going out due to fear of having a bowel movement. She has been using Metamucil intermittently. Celiac antibody panel was negative in 2022. She does not smoke tobacco.  She may have a couple glasses 1 weekend. Her father may have had colon polyps. Dad had peptic ulcer disease. Mom had breast cancer. Paternal uncle had colon cancer at age 76.     Patient is up-to-date with her colonoscopy having had this March 10, 2022 and this is outlined below    2/14/2023 laboratory data  Sodium 141  Potassium 4.0  Creatinine 0.63  AST 16  ALT 21  Alk phos 70  Albumin 3.9  Vitamin D 41.7  WC 6.06 Hgb 14.9 HCT 45.4 MCV 96 platelet count 017,252  Hemoglobin A1c 5.1  TSH 1.09        3/10/2022 colonoscopy  [place] bowel prep score 6  Pediatric scope  4 mm polyp distal rectum status post cold snare polypectomy. Tubular adenoma  12 mm semipedunculated polyp hepatic flexure status post hot snare polypectomy. Tubular adenoma  8 mm polyp mid ascending colon status post cold snare polypectomy. Tubular adenoma  6 mm flat polyp distal ascending colon status post cold snare polypectomy. Tubular adenoma  Moderate diverticulosis sigmoid colon  ? Mild rectal prolapse    Allergies   Allergen Reactions   • Tetracycline Rash     Current Outpatient Medications   Medication Sig Dispense Refill   • clindamycin-benzoyl peroxide (BENZACLIN) gel Apply topically 2 (two) times a day     • ergocalciferol (VITAMIN D2) 50,000 units TAKE 1 CAPSULE BY MOUTH 2 TIMES PER WEEK     • famotidine (PEPCID) 40 MG tablet Take 1 tablet (40 mg total) by mouth daily at bedtime Take in evening 2 hours prior to bed 30 tablet 3   • omeprazole (PriLOSEC) 20 mg delayed release capsule Take 1 capsule (20 mg total) by mouth daily 30 capsule 4   • ibandronate (BONIVA) 150 MG tablet Take 150 mg by mouth every 30 (thirty) days (Patient not taking: Reported on 7/13/2023)       No current facility-administered medications for this visit.      MEDICAL HISTORY:  Past Medical History:   Diagnosis Date   • BRCA1 negative    • BRCA2 negative    • Hepatitis C      Past Surgical History:   Procedure Laterality Date   • BREAST BIOPSY Bilateral benign 20yrs ago   • COLONOSCOPY       Social History     Substance and Sexual Activity   Alcohol Use Yes    Comment: social     Social History     Substance and Sexual Activity   Drug Use Never     Social History     Tobacco Use   Smoking Status Former   Smokeless Tobacco Never     Family History   Problem Relation Age of Onset   • Breast cancer Mother 76   • No Known Problems Maternal Grandmother    • No Known Problems Paternal Grandmother    • No Known Problems Maternal Aunt        REVIEW OF SYSTEMS:  CONSTITUTIONAL: Denies any fever, chills, rigors, and weight loss. HEENT: No earache or tinnitus. Denies hearing loss or visual disturbances. CARDIOVASCULAR: See above  RESPIRATORY: Denies any cough, hemoptysis, shortness of breath or dyspnea on exertion. GASTROINTESTINAL: As noted in the History of Present Illness. GENITOURINARY: No problems with urination. Denies any hematuria or dysuria. NEUROLOGIC: No dizziness or vertigo, denies headaches. MUSCULOSKELETAL: Denies any muscle or joint pain. SKIN: Denies skin rashes or itching. ENDOCRINE: Denies excessive thirst. Denies intolerance to heat or cold. PSYCHOSOCIAL: He does report occasional anxiety  Objective   Blood pressure 112/70, pulse 64, resp. rate 19, height 5' 9" (1.753 m), weight 74.4 kg (164 lb), SpO2 97 %. Body mass index is 24.22 kg/m². PHYSICAL EXAM:   General Appearance: Alert, cooperative, no distress  HEENT: Normocephalic, atraumatic, anicteric. Neck: Supple, symmetrical, trachea midline  Lungs: Clear to auscultation bilaterally; no rales, rhonchi or wheezing; respirations unlabored   Heart: Regular rate and rhythm; no murmur, rub, or gallop. Abdomen: Soft, bowel sounds normal, non-tender, non-distended; no masses, there is no hepatosplenomegaly. No spider angiomas  Genitalia: Deferred   Rectal: Deferred   Extremities: No cyanosis, clubbing or edema   Skin: No jaundice, rashes, or lesions   Lymph nodes: No palpable cervical lymphadenopathy   Lab Results:   No visits with results within 2 Month(s) from this visit.    Latest known visit with results is:   Appointment on 02/14/2023   Component Date Value   • WBC 02/14/2023 6.06    • RBC 02/14/2023 4.71    • Hemoglobin 02/14/2023 14.9    • Hematocrit 02/14/2023 45.4    • MCV 02/14/2023 96    • MCH 02/14/2023 31.6    • MCHC 02/14/2023 32.8    • RDW 02/14/2023 12.1    • MPV 02/14/2023 10.1    • Platelets 06/31/1886 328    • nRBC 02/14/2023 0    • Neutrophils Relative 02/14/2023 62    • Immat GRANS % 02/14/2023 0    • Lymphocytes Relative 02/14/2023 29    • Monocytes Relative 02/14/2023 6    • Eosinophils Relative 02/14/2023 2    • Basophils Relative 02/14/2023 1    • Neutrophils Absolute 02/14/2023 3.73    • Immature Grans Absolute 02/14/2023 0.01    • Lymphocytes Absolute 02/14/2023 1.76    • Monocytes Absolute 02/14/2023 0.39    • Eosinophils Absolute 02/14/2023 0.11    • Basophils Absolute 02/14/2023 0.06    • Sodium 02/14/2023 141    • Potassium 02/14/2023 4.0    • Chloride 02/14/2023 111 (H)    • CO2 02/14/2023 30    • ANION GAP 02/14/2023 0 (L)    • BUN 02/14/2023 12    • Creatinine 02/14/2023 0.63    • Glucose 02/14/2023 93    • Calcium 02/14/2023 9.8    • AST 02/14/2023 16    • ALT 02/14/2023 21    • Alkaline Phosphatase 02/14/2023 70    • Total Protein 02/14/2023 7.4    • Albumin 02/14/2023 3.9    • Total Bilirubin 02/14/2023 0.45    • eGFR 02/14/2023 93    • Hemoglobin A1C 02/14/2023 5.1    • EAG 02/14/2023 100    • Cholesterol 02/14/2023 200    • Triglycerides 02/14/2023 152 (H)    • HDL, Direct 02/14/2023 82    • LDL Calculated 02/14/2023 88    • Non-HDL-Chol (CHOL-HDL) 02/14/2023 118    • TSH 3RD GENERATON 02/14/2023 1.090    • Free T4 02/14/2023 1.04    • Vit D, 25-Hydroxy 02/14/2023 41.7      Radiology Results:   No results found.   Sander Catherine DO   07/13/23   Cc:

## 2023-07-13 ENCOUNTER — OFFICE VISIT (OUTPATIENT)
Dept: GASTROENTEROLOGY | Facility: CLINIC | Age: 68
End: 2023-07-13
Payer: MEDICARE

## 2023-07-13 VITALS
SYSTOLIC BLOOD PRESSURE: 112 MMHG | DIASTOLIC BLOOD PRESSURE: 70 MMHG | OXYGEN SATURATION: 97 % | BODY MASS INDEX: 24.29 KG/M2 | HEIGHT: 69 IN | HEART RATE: 64 BPM | RESPIRATION RATE: 19 BRPM | WEIGHT: 164 LBS

## 2023-07-13 DIAGNOSIS — K21.9 GASTROESOPHAGEAL REFLUX DISEASE, UNSPECIFIED WHETHER ESOPHAGITIS PRESENT: ICD-10-CM

## 2023-07-13 DIAGNOSIS — R07.89 CHEST PRESSURE: Primary | ICD-10-CM

## 2023-07-13 DIAGNOSIS — R10.13 EPIGASTRIC DISCOMFORT: ICD-10-CM

## 2023-07-13 DIAGNOSIS — R19.7 INTERMITTENT DIARRHEA: ICD-10-CM

## 2023-07-13 DIAGNOSIS — Z86.010 HISTORY OF COLON POLYPS: ICD-10-CM

## 2023-07-13 PROBLEM — Z86.0100 HISTORY OF COLON POLYPS: Status: ACTIVE | Noted: 2023-07-13

## 2023-07-13 PROCEDURE — 99204 OFFICE O/P NEW MOD 45 MIN: CPT | Performed by: INTERNAL MEDICINE

## 2023-07-13 RX ORDER — OMEPRAZOLE 20 MG/1
20 CAPSULE, DELAYED RELEASE ORAL DAILY
Qty: 90 CAPSULE | Refills: 4 | Status: SHIPPED | OUTPATIENT
Start: 2023-07-13

## 2023-07-13 RX ORDER — OMEPRAZOLE 20 MG/1
20 CAPSULE, DELAYED RELEASE ORAL DAILY
Qty: 30 CAPSULE | Refills: 4 | Status: SHIPPED | OUTPATIENT
Start: 2023-07-13 | End: 2023-07-13

## 2023-07-13 RX ORDER — FAMOTIDINE 40 MG/1
40 TABLET, FILM COATED ORAL
Qty: 30 TABLET | Refills: 3 | Status: SHIPPED | OUTPATIENT
Start: 2023-07-13 | End: 2023-07-13

## 2023-07-13 RX ORDER — FAMOTIDINE 40 MG/1
40 TABLET, FILM COATED ORAL
Qty: 90 TABLET | Refills: 3 | Status: SHIPPED | OUTPATIENT
Start: 2023-07-13

## 2023-07-13 NOTE — ASSESSMENT & PLAN NOTE
Rey Guillory continues to experience some intermittent diarrhea. The exact cause for this is not clear. I suspect underlying dietary intolerance. I did encourage her to try to use her fiber supplement on a more regular basis. She is currently using Metamucil. She should try to take this on a daily basis. Check fecal elastase  Check qualitative fecal fat  Celiac antibody panel was negative previously  Try complete lactose-free diet for about 1 month to see if this improves symptoms. If symptoms persist could consider breath testing to evaluate for small intestinal bacterial overgrowth or empiric treatment.

## 2023-07-13 NOTE — PATIENT INSTRUCTIONS
Chest pressure  Patient reports having chest pressure waking her from sleep couple nights a week radiating through to her back into her jaw. There is no associated shortness of breath. This tends to get better after taking a few Tums and drinking some soda. Given that she is 78years of age and has atypical chest discomfort I would recommend cardiac evaluation. However I suspect this is more related to acid. It is prudent to rule out a cardiac etiology. For now I will start her on omeprazole 20 mg daily best to take 30 minutes to 1 hour before 1 meal a day. Add famotidine 40 mg 2 hours before bed. Lifestyle modifications for gastroesophageal reflux disease were discussed and include limiting fried and fatty foods, mints, chocolates, carbonated and caffeinated beverages , and alcohol, etc.  Avoid lying down for 2-3 hours after meals. If you have nighttime symptoms consider raising the head of the bed up on 4-6 inch blocks. Pillows typically are not useful. If you are overweight, weight loss will be helpful. Given her atypical symptoms, if cardiac evaluation is unremarkable I would pursue upper endoscopy. I explained to the patient the procedure of upper endoscopy as well as its potential risk which are approximately 1 in 1000 chance of bleeding, infection, and perforation. Intermittent diarrhea  Mindi Almeida continues to experience some intermittent diarrhea. The exact cause for this is not clear. I suspect underlying dietary intolerance. I did encourage her to try to use her fiber supplement on a more regular basis. She is currently using Metamucil. She should try to take this on a daily basis. Check fecal elastase  Check qualitative fecal fat  Celiac antibody panel was negative previously  Try complete lactose-free diet for about 1 month to see if this improves symptoms.   If symptoms persist could consider breath testing to evaluate for small intestinal bacterial overgrowth or empiric treatment. Epigastric discomfort  Mile Savage does report epigastric discomfort about twice a month. There is no associated nausea or vomiting. May last about 10 minutes. She thinks this is food related. Whether this is reflux related, acid peptic related, or gallbladder is not clear. Check ultrasound of the abdomen  Check liver profile  She will be scheduled for upper endoscopy to further evaluate this as well. Symptoms are atypical for gallbladder disease. Limit NSAID use    GERD (gastroesophageal reflux disease)  Please see comments under chest pressure. Is quite possible patient's symptoms are related to nocturnal reflux. History of colon polyps  Patient was noted to have 4 mm polyp distal rectum status post cold snare polypectomy. Tubular adenoma  12 mm semipedunculated polyp hepatic flexure status post hot snare polypectomy. Tubular adenoma  8 mm polyp mid ascending colon status post cold snare polypectomy. Tubular adenoma  6 mm flat polyp distal ascending colon status post cold snare polypectomy. Tubular adenoma  Based upon these findings I would recommend repeating a colonoscopy in March 2025.

## 2023-07-13 NOTE — ASSESSMENT & PLAN NOTE
Patient reports having chest pressure waking her from sleep couple nights a week radiating through to her back into her jaw. There is no associated shortness of breath. This tends to get better after taking a few Tums and drinking some soda. Given that she is 78years of age and has atypical chest discomfort I would recommend cardiac evaluation. However I suspect this is more related to acid. It is prudent to rule out a cardiac etiology. For now I will start her on omeprazole 20 mg daily best to take 30 minutes to 1 hour before 1 meal a day. Add famotidine 40 mg 2 hours before bed. Lifestyle modifications for gastroesophageal reflux disease were discussed and include limiting fried and fatty foods, mints, chocolates, carbonated and caffeinated beverages , and alcohol, etc.  Avoid lying down for 2-3 hours after meals. If you have nighttime symptoms consider raising the head of the bed up on 4-6 inch blocks. Pillows typically are not useful. If you are overweight, weight loss will be helpful. Given her atypical symptoms, if cardiac evaluation is unremarkable I would pursue upper endoscopy. I explained to the patient the procedure of upper endoscopy as well as its potential risk which are approximately 1 in 1000 chance of bleeding, infection, and perforation.

## 2023-07-13 NOTE — ASSESSMENT & PLAN NOTE
Soledad Parks does report epigastric discomfort about twice a month. There is no associated nausea or vomiting. May last about 10 minutes. She thinks this is food related. Whether this is reflux related, acid peptic related, or gallbladder is not clear. Check ultrasound of the abdomen  Check liver profile  She will be scheduled for upper endoscopy to further evaluate this as well. Symptoms are atypical for gallbladder disease.   Limit NSAID use

## 2023-07-13 NOTE — ASSESSMENT & PLAN NOTE
Please see comments under chest pressure. Is quite possible patient's symptoms are related to nocturnal reflux.

## 2023-07-13 NOTE — ASSESSMENT & PLAN NOTE
Patient was noted to have 4 mm polyp distal rectum status post cold snare polypectomy. Tubular adenoma  12 mm semipedunculated polyp hepatic flexure status post hot snare polypectomy. Tubular adenoma  8 mm polyp mid ascending colon status post cold snare polypectomy. Tubular adenoma  6 mm flat polyp distal ascending colon status post cold snare polypectomy. Tubular adenoma  Based upon these findings I would recommend repeating a colonoscopy in March 2025.

## 2023-08-03 ENCOUNTER — APPOINTMENT (OUTPATIENT)
Dept: LAB | Facility: CLINIC | Age: 68
End: 2023-08-03
Payer: MEDICARE

## 2023-08-03 ENCOUNTER — CONSULT (OUTPATIENT)
Dept: CARDIOLOGY CLINIC | Facility: CLINIC | Age: 68
End: 2023-08-03
Payer: MEDICARE

## 2023-08-03 VITALS
OXYGEN SATURATION: 96 % | DIASTOLIC BLOOD PRESSURE: 76 MMHG | HEIGHT: 69 IN | BODY MASS INDEX: 24.26 KG/M2 | SYSTOLIC BLOOD PRESSURE: 118 MMHG | HEART RATE: 74 BPM | WEIGHT: 163.8 LBS | TEMPERATURE: 97.6 F

## 2023-08-03 DIAGNOSIS — R10.13 EPIGASTRIC DISCOMFORT: ICD-10-CM

## 2023-08-03 DIAGNOSIS — R07.89 CHEST PRESSURE: Primary | ICD-10-CM

## 2023-08-03 DIAGNOSIS — R19.7 INTERMITTENT DIARRHEA: ICD-10-CM

## 2023-08-03 DIAGNOSIS — E78.5 HYPERLIPIDEMIA, UNSPECIFIED HYPERLIPIDEMIA TYPE: ICD-10-CM

## 2023-08-03 DIAGNOSIS — Z01.810 PREOP CARDIOVASCULAR EXAM: ICD-10-CM

## 2023-08-03 LAB
ALBUMIN SERPL BCP-MCNC: 3.9 G/DL (ref 3.5–5)
ALP SERPL-CCNC: 51 U/L (ref 46–116)
ALT SERPL W P-5'-P-CCNC: 26 U/L (ref 12–78)
AST SERPL W P-5'-P-CCNC: 14 U/L (ref 5–45)
BILIRUB DIRECT SERPL-MCNC: 0.17 MG/DL (ref 0–0.2)
BILIRUB SERPL-MCNC: 0.62 MG/DL (ref 0.2–1)
CRP SERPL QL: 3.6 MG/L
PROT SERPL-MCNC: 7.7 G/DL (ref 6.4–8.4)

## 2023-08-03 PROCEDURE — 80076 HEPATIC FUNCTION PANEL: CPT

## 2023-08-03 PROCEDURE — 86140 C-REACTIVE PROTEIN: CPT

## 2023-08-03 PROCEDURE — 99204 OFFICE O/P NEW MOD 45 MIN: CPT | Performed by: INTERNAL MEDICINE

## 2023-08-03 PROCEDURE — 36415 COLL VENOUS BLD VENIPUNCTURE: CPT

## 2023-08-03 PROCEDURE — 82705 FATS/LIPIDS FECES QUAL: CPT

## 2023-08-03 PROCEDURE — 82653 EL-1 FECAL QUANTITATIVE: CPT

## 2023-08-03 PROCEDURE — 93000 ELECTROCARDIOGRAM COMPLETE: CPT | Performed by: INTERNAL MEDICINE

## 2023-08-03 NOTE — PROGRESS NOTES
Cardiology Consultation     Scott Javier  [id number]  1955  [facility] [address] [address] [phone]      1. Chest pressure  Ambulatory Referral to Cardiology    CTA cardiac    Basic metabolic panel    POCT ECG    metoprolol tartrate (LOPRESSOR) 25 mg tablet      2. Hyperlipidemia, unspecified hyperlipidemia type        3. Preop cardiovascular exam  CTA cardiac    Basic metabolic panel    POCT ECG    metoprolol tartrate (LOPRESSOR) 25 mg tablet          Discussion/Summary:  1. Chest pain  2. Hyperlipidemia  3. Perioperative risk stratification    -ECG performed in the office today shows sinus rhythm heart rate 74 bpm  -After discussion with patient she does not believe she can adequately exercise for stress testing will have her undergo coronary CTA for further evaluation  -We will check BMP 1 week prior to CTA for evaluation of renal dysfunction  -We will give patient metoprolol to tartrate 25 mg tablet to be taken 1 hour prior to CTA with hold parameters for heart rate less than 50  -Patient counseled on dietary and lifestyle modifications including following a low-salt, low-fat, heart healthy diet  -Patient will be seen in 3 months or sooner if necessary once testing is completed  -Pending above testing will assist with perioperative risk stratification for endoscopy  -Patient counseled if she were to have any warning or alarm type symptoms she is to seek emergency medical care immediately. History of Present Illness:  -Patient is a 70-year-old female with GERD, history of colonic polyps who follows with gastroenterology and presents to the office today after referral from their office for chest pain. Patient notes pain has been present for approximately the past year however last couple months does seem to be worsening in intensity.   It is substernal in nature but does not seem to radiate often but when it does does radiate up to her jaw line. She notes it seems to come on at different times throughout the day and while not always with exertion and has had some components. She has also potentially noticed a dietary component to this as well but even with significant dietary changes and treatment from her gastroenterologist this is still not resolved the issue.  -Currently in the office today she denies any active chest pain, palpitations, lightness or dizziness, loss of consciousness, shortness of breath, lower extremity edema, orthopnea or bendopnea. Unfortunately due to some issues with osteoporosis and knee arthropathy she does not believe she would be adequately able to exercise for treadmill stress testing.  -Patient denies any tobacco or illicit drug use and has very rare intermittent social alcohol use. -She does note family history of heart disease particularly in her father who had coronary artery disease and atrial fibrillation beginning around age 79. Patient Active Problem List   Diagnosis   • Positive colorectal cancer screening using Cologuard test   • History of hepatitis C   • Intermittent diarrhea   • Elevated MCV   • Chest pressure   • Epigastric discomfort   • GERD (gastroesophageal reflux disease)   • History of colon polyps     Past Medical History:   Diagnosis Date   • BRCA1 negative    • BRCA2 negative    • Hepatitis C      Social History     Socioeconomic History   • Marital status:       Spouse name: Not on file   • Number of children: Not on file   • Years of education: Not on file   • Highest education level: Not on file   Occupational History   • Not on file   Tobacco Use   • Smoking status: Former   • Smokeless tobacco: Never   Vaping Use   • Vaping Use: Never used   Substance and Sexual Activity   • Alcohol use: Yes     Comment: social   • Drug use: Never   • Sexual activity: Yes     Partners: Male   Other Topics Concern • Not on file   Social History Narrative   • Not on file     Social Determinants of Health     Financial Resource Strain: Not on file   Food Insecurity: Not on file   Transportation Needs: Not on file   Physical Activity: Not on file   Stress: Not on file   Social Connections: Not on file   Intimate Partner Violence: Not on file   Housing Stability: Not on file      Family History   Problem Relation Age of Onset   • Breast cancer Mother 76   • No Known Problems Maternal Grandmother    • No Known Problems Paternal Grandmother    • No Known Problems Maternal Aunt      Past Surgical History:   Procedure Laterality Date   • BREAST BIOPSY Bilateral benign 20yrs ago   • COLONOSCOPY         Current Outpatient Medications:   •  metoprolol tartrate (LOPRESSOR) 25 mg tablet, Take 1 tablet by mouth (25 mg) 1 hour prior to coronary CTA, hold for heart rate less than 50 bpm., Disp: 1 tablet, Rfl: 0  •  clindamycin-benzoyl peroxide (BENZACLIN) gel, Apply topically 2 (two) times a day, Disp: , Rfl:   •  ergocalciferol (VITAMIN D2) 50,000 units, TAKE 1 CAPSULE BY MOUTH 2 TIMES PER WEEK, Disp: , Rfl:   •  famotidine (PEPCID) 40 MG tablet, Take 1 tablet (40 mg total) by mouth daily at bedtime Take in evening 2 hours prior to bed, Disp: 90 tablet, Rfl: 3  •  ibandronate (BONIVA) 150 MG tablet, Take 150 mg by mouth every 30 (thirty) days (Patient not taking: Reported on 7/13/2023), Disp: , Rfl:   •  omeprazole (PriLOSEC) 20 mg delayed release capsule, Take 1 capsule (20 mg total) by mouth daily, Disp: 90 capsule, Rfl: 4  Allergies   Allergen Reactions   • Tetracycline Rash         Labs:  No visits with results within 2 Month(s) from this visit.    Latest known visit with results is:   Appointment on 02/14/2023   Component Date Value   • WBC 02/14/2023 6.06    • RBC 02/14/2023 4.71    • Hemoglobin 02/14/2023 14.9    • Hematocrit 02/14/2023 45.4    • MCV 02/14/2023 96    • MCH 02/14/2023 31.6    • MCHC 02/14/2023 32.8    • RDW 02/14/2023 12.1    • MPV 02/14/2023 10.1    • Platelets 53/89/3325 328    • nRBC 02/14/2023 0    • Neutrophils Relative 02/14/2023 62    • Immat GRANS % 02/14/2023 0    • Lymphocytes Relative 02/14/2023 29    • Monocytes Relative 02/14/2023 6    • Eosinophils Relative 02/14/2023 2    • Basophils Relative 02/14/2023 1    • Neutrophils Absolute 02/14/2023 3.73    • Immature Grans Absolute 02/14/2023 0.01    • Lymphocytes Absolute 02/14/2023 1.76    • Monocytes Absolute 02/14/2023 0.39    • Eosinophils Absolute 02/14/2023 0.11    • Basophils Absolute 02/14/2023 0.06    • Sodium 02/14/2023 141    • Potassium 02/14/2023 4.0    • Chloride 02/14/2023 111 (H)    • CO2 02/14/2023 30    • ANION GAP 02/14/2023 0 (L)    • BUN 02/14/2023 12    • Creatinine 02/14/2023 0.63    • Glucose 02/14/2023 93    • Calcium 02/14/2023 9.8    • AST 02/14/2023 16    • ALT 02/14/2023 21    • Alkaline Phosphatase 02/14/2023 70    • Total Protein 02/14/2023 7.4    • Albumin 02/14/2023 3.9    • Total Bilirubin 02/14/2023 0.45    • eGFR 02/14/2023 93    • Hemoglobin A1C 02/14/2023 5.1    • EAG 02/14/2023 100    • Cholesterol 02/14/2023 200    • Triglycerides 02/14/2023 152 (H)    • HDL, Direct 02/14/2023 82    • LDL Calculated 02/14/2023 88    • Non-HDL-Chol (CHOL-HDL) 02/14/2023 118    • TSH 3RD GENERATON 02/14/2023 1.090    • Free T4 02/14/2023 1.04    • Vit D, 25-Hydroxy 02/14/2023 41.7         Imaging: No results found. Review of Systems:  Review of Systems   Constitutional: Negative for chills, diaphoresis, fatigue and fever. HENT: Negative for trouble swallowing and voice change. Eyes: Negative for pain and redness. Respiratory: Negative for shortness of breath and wheezing. Cardiovascular: Negative for chest pain, palpitations and leg swelling. Gastrointestinal: Negative for abdominal pain, constipation, diarrhea, nausea and vomiting. Genitourinary: Negative for dysuria. Musculoskeletal: Positive for arthralgias. Negative for neck pain and neck stiffness. Neurological: Negative for dizziness, syncope, light-headedness and headaches. Psychiatric/Behavioral: Negative for agitation and confusion. All other systems reviewed and are negative. Vitals:    08/03/23 1439   BP: 118/76   BP Location: Right arm   Patient Position: Sitting   Cuff Size: Standard   Pulse: 74   Temp: 97.6 °F (36.4 °C)   TempSrc: Temporal   SpO2: 96%   Weight: 74.3 kg (163 lb 12.8 oz)   Height: 5' 9" (1.753 m)     Vitals:    08/03/23 1439   Weight: 74.3 kg (163 lb 12.8 oz)     Height: 5' 9" (175.3 cm)     Physical Exam:  Physical Exam  Vitals reviewed. Constitutional:       General: She is not in acute distress. Appearance: Normal appearance. She is not diaphoretic. HENT:      Head: Normocephalic and atraumatic. Eyes:      General:         Right eye: No discharge. Left eye: No discharge. Neck:      Comments: Trachea midline, no JVD present  Cardiovascular:      Rate and Rhythm: Normal rate and regular rhythm. Heart sounds: No friction rub. Pulmonary:      Effort: Pulmonary effort is normal. No respiratory distress. Breath sounds: No wheezing. Chest:      Chest wall: No tenderness. Abdominal:      General: Bowel sounds are normal.      Palpations: Abdomen is soft. Tenderness: There is no abdominal tenderness. There is no rebound. Musculoskeletal:      Right lower leg: No edema. Left lower leg: No edema. Skin:     General: Skin is warm and dry. Neurological:      Mental Status: She is alert. Comments: Awake, alert, able to answer questions appropriately, able to move extremities bilaterally.    Psychiatric:         Mood and Affect: Mood normal.         Behavior: Behavior normal.

## 2023-08-07 ENCOUNTER — HOSPITAL ENCOUNTER (OUTPATIENT)
Dept: ULTRASOUND IMAGING | Facility: HOSPITAL | Age: 68
Discharge: HOME | End: 2023-08-07
Attending: INTERNAL MEDICINE
Payer: MEDICARE

## 2023-08-07 DIAGNOSIS — R10.13 EPIGASTRIC DISCOMFORT: ICD-10-CM

## 2023-08-07 LAB — ELASTASE PANC STL-MCNT: 131 UG ELAST./G

## 2023-08-07 PROCEDURE — 76700 US EXAM ABDOM COMPLETE: CPT

## 2023-08-07 NOTE — RESULT ENCOUNTER NOTE
Please inform patient that the C-reactive protein, a nonspecific test of inflammation is just mildly elevated. Liver enzymes are normal.  Fecal elastase and fecal fat are pending.   Thank you

## 2023-08-08 LAB
FAT STL QL: NORMAL
NEUTRAL FAT STL QL: NORMAL

## 2023-08-13 NOTE — RESULT ENCOUNTER NOTE
Please inform patient that the fecal elastase is low at 131. However no evidence of fat malabsorption in the stool. Would recommend proceeding with upper endoscopy provided she is cleared by cardiology to do so. Hold off on the addition of pancreatic enzyme supplements at this time. Ultrasound was completed however results are pending.   Thank you

## 2023-08-16 NOTE — RESULT ENCOUNTER NOTE
Please inform patient that ultrasound revealed fatty infiltration of the liver. The gallbladder was normal.  The spleen revealed a small cysts which is a benign finding. Given fatty infiltration liver would cut back or eliminate any alcohol, beer, or wine consumption at this time. Liver enzymes in August were within the normal range.   Thank you
No risk alerts present

## 2023-08-30 ENCOUNTER — APPOINTMENT (OUTPATIENT)
Dept: LAB | Facility: CLINIC | Age: 68
End: 2023-08-30
Payer: MEDICARE

## 2023-08-30 DIAGNOSIS — Z01.810 PREOP CARDIOVASCULAR EXAM: ICD-10-CM

## 2023-08-30 DIAGNOSIS — R07.89 CHEST PRESSURE: ICD-10-CM

## 2023-08-30 LAB
ANION GAP SERPL CALCULATED.3IONS-SCNC: 4 MMOL/L
BUN SERPL-MCNC: 15 MG/DL (ref 5–25)
CALCIUM SERPL-MCNC: 9.3 MG/DL (ref 8.4–10.2)
CHLORIDE SERPL-SCNC: 108 MMOL/L (ref 96–108)
CO2 SERPL-SCNC: 30 MMOL/L (ref 21–32)
CREAT SERPL-MCNC: 0.76 MG/DL (ref 0.6–1.3)
GFR SERPL CREATININE-BSD FRML MDRD: 81 ML/MIN/1.73SQ M
GLUCOSE P FAST SERPL-MCNC: 88 MG/DL (ref 65–99)
POTASSIUM SERPL-SCNC: 4.1 MMOL/L (ref 3.5–5.3)
SODIUM SERPL-SCNC: 142 MMOL/L (ref 135–147)

## 2023-08-30 PROCEDURE — 80048 BASIC METABOLIC PNL TOTAL CA: CPT

## 2023-08-30 PROCEDURE — 36415 COLL VENOUS BLD VENIPUNCTURE: CPT

## 2023-09-05 ENCOUNTER — HOSPITAL ENCOUNTER (OUTPATIENT)
Dept: CT IMAGING | Facility: HOSPITAL | Age: 68
Discharge: HOME | End: 2023-09-05
Attending: INTERNAL MEDICINE
Payer: MEDICARE

## 2023-09-05 VITALS — DIASTOLIC BLOOD PRESSURE: 68 MMHG | SYSTOLIC BLOOD PRESSURE: 114 MMHG | RESPIRATION RATE: 20 BRPM | HEART RATE: 75 BPM

## 2023-09-05 DIAGNOSIS — R07.89 CHEST PRESSURE: ICD-10-CM

## 2023-09-05 DIAGNOSIS — Z01.810 PREOP CARDIOVASCULAR EXAM: ICD-10-CM

## 2023-09-05 PROCEDURE — G1004 CDSM NDSC: HCPCS

## 2023-09-05 PROCEDURE — 75574 CT ANGIO HRT W/3D IMAGE: CPT

## 2023-09-05 RX ORDER — METOPROLOL TARTRATE 5 MG/5ML
5 INJECTION INTRAVENOUS
Status: DISCONTINUED | OUTPATIENT
Start: 2023-09-05 | End: 2023-09-06 | Stop reason: HOSPADM

## 2023-09-05 RX ORDER — NITROGLYCERIN 0.4 MG/1
0.4 TABLET SUBLINGUAL ONCE
Status: COMPLETED | OUTPATIENT
Start: 2023-09-05 | End: 2023-09-05

## 2023-09-05 RX ADMIN — METOPROLOL TARTRATE 5 MG: 1 INJECTION, SOLUTION INTRAVENOUS at 13:34

## 2023-09-05 RX ADMIN — METOPROLOL TARTRATE 5 MG: 1 INJECTION, SOLUTION INTRAVENOUS at 13:24

## 2023-09-05 RX ADMIN — METOPROLOL TARTRATE 5 MG: 1 INJECTION, SOLUTION INTRAVENOUS at 13:39

## 2023-09-05 RX ADMIN — IOHEXOL 80 ML: 350 INJECTION, SOLUTION INTRAVENOUS at 13:51

## 2023-09-05 RX ADMIN — NITROGLYCERIN 0.4 MG: 0.4 TABLET SUBLINGUAL at 13:44

## 2023-09-05 RX ADMIN — METOPROLOL TARTRATE 5 MG: 1 INJECTION, SOLUTION INTRAVENOUS at 13:29

## 2023-09-05 NOTE — NURSING NOTE
Patient arrived for cardiac cta. Test, medications reviewed. Pt took 25 mg metoprolol 1 hr prior per cardiology. IV metoprolol 5 mg x4 doses given to reach target HR. Pt tolerated test well. See vitals flowsheet. Asymptomatic upon departure with boyfriend driving.

## 2023-09-19 ENCOUNTER — TELEPHONE (OUTPATIENT)
Dept: FAMILY MEDICINE CLINIC | Facility: CLINIC | Age: 68
End: 2023-09-19

## 2023-09-29 ENCOUNTER — CLINICAL SUPPORT (OUTPATIENT)
Dept: FAMILY MEDICINE CLINIC | Facility: CLINIC | Age: 68
End: 2023-09-29
Payer: MEDICARE

## 2023-09-29 DIAGNOSIS — Q78.2 OSTEOPETROSIS: ICD-10-CM

## 2023-09-29 DIAGNOSIS — M81.0 OSTEOPOROSIS, UNSPECIFIED OSTEOPOROSIS TYPE, UNSPECIFIED PATHOLOGICAL FRACTURE PRESENCE: Primary | ICD-10-CM

## 2023-09-29 PROCEDURE — 96372 THER/PROPH/DIAG INJ SC/IM: CPT

## 2023-11-15 ENCOUNTER — OFFICE VISIT (OUTPATIENT)
Dept: CARDIOLOGY CLINIC | Facility: CLINIC | Age: 68
End: 2023-11-15
Payer: MEDICARE

## 2023-11-15 VITALS
OXYGEN SATURATION: 97 % | BODY MASS INDEX: 25.15 KG/M2 | WEIGHT: 169.8 LBS | SYSTOLIC BLOOD PRESSURE: 132 MMHG | TEMPERATURE: 97.6 F | DIASTOLIC BLOOD PRESSURE: 68 MMHG | HEART RATE: 80 BPM | HEIGHT: 69 IN

## 2023-11-15 DIAGNOSIS — R03.0 ELEVATED BLOOD-PRESSURE READING WITHOUT DIAGNOSIS OF HYPERTENSION: ICD-10-CM

## 2023-11-15 DIAGNOSIS — E78.5 HYPERLIPIDEMIA, UNSPECIFIED HYPERLIPIDEMIA TYPE: ICD-10-CM

## 2023-11-15 DIAGNOSIS — Z01.810 PREOP CARDIOVASCULAR EXAM: Primary | ICD-10-CM

## 2023-11-15 DIAGNOSIS — R07.9 CHEST PAIN, UNSPECIFIED TYPE: ICD-10-CM

## 2023-11-15 PROCEDURE — 99214 OFFICE O/P EST MOD 30 MIN: CPT | Performed by: INTERNAL MEDICINE

## 2023-11-15 RX ORDER — DENOSUMAB 60 MG/ML
60 INJECTION SUBCUTANEOUS ONCE
COMMUNITY

## 2023-11-15 NOTE — PROGRESS NOTES
Cardiology Follow up    Xiao Starr  [id number]  1955  [facility] [address] [address] [phone]      1. Preop cardiovascular exam        2. Elevated blood-pressure reading without diagnosis of hypertension        3. Hyperlipidemia, unspecified hyperlipidemia type  Comprehensive metabolic panel    Lipid Panel with Direct LDL reflex      4. Chest pain, unspecified type            Discussion/Summary:  1. Chest pain-improved  2. Hyperlipidemia  3. Elevated blood-pressure reading without diagnosis hypertension  4.   Perioperative risk stratification    -ECG 8/3/2023 showing sinus rhythm heart rate 74 bpm  -Coronary CTA 9/5/2023 showing total calcium score 19 with minimal nonobstructive coronary artery disease with recommendations for preventative therapy and risk factor modification  -After discussion with patient prior to initiation of medical therapy she wishes to trial dietary and lifestyle modifications  -As patient notes she is very physically active now and doing better and coronary CT relatively unrevealing patient is appropriate risk to proceed with endoscopy as planned.  -Patient will monitor home blood pressure readings and either let our office or her primary care physician's office know if consistently elevated greater than 130s/80s mmHg for initiation of medical therapy  -Patient counseled on dietary and lifestyle modifications including following a low-salt, low-fat, heart healthy diet with sodium restriction to less than 1800 mg of sodium daily and continued physical activity with monitoring for symptoms  -Patient will follow-up with her gastroenterologist for GERD symptoms and evaluation  -Patient will be seen in 1 year or sooner if necessary  -Patient counseled if she were to have any warning or alarm type symptoms she is to seek emergency medical care immediately.  -We will check laboratory studies including fasting lipid panel and CMP in 1 year prior to next office visit    History of Present Illness:  -Patient is a 60-year-old female with GERD, history of colonic polyps who follows with gastroenterology and presented to the office in August 2023 after referral from GI team for chest pain. She had noted that it been present over the prior couple months however does note more recently that with better dietary and lifestyle modifications and initiation of medical therapy for her reflux disease symptoms have significantly improved. She notes she has also been more active and denies any exertional issues and states overall feeling much better.  -Currently in the office today she denies any chest pain, palpitations, lightness dizziness, loss of consciousness, shortness of breath, lower extreme edema, orthopnea or bendopnea. She notes she has never had issues with elevated blood pressure at home or in the past and is otherwise doing well. She does note there is still some room for improvement with dietary modifications particularly reducing her sodium and sugar intake. Patient Active Problem List   Diagnosis    Positive colorectal cancer screening using Cologuard test    History of hepatitis C    Intermittent diarrhea    Elevated MCV    Chest pressure    Epigastric discomfort    GERD (gastroesophageal reflux disease)    History of colon polyps     Past Medical History:   Diagnosis Date    BRCA1 negative     BRCA2 negative     Hepatitis C      Social History     Socioeconomic History    Marital status:       Spouse name: Not on file    Number of children: Not on file    Years of education: Not on file    Highest education level: Not on file   Occupational History    Not on file   Tobacco Use    Smoking status: Former    Smokeless tobacco: Never   Vaping Use    Vaping Use: Never used   Substance and Sexual Activity    Alcohol use: Yes     Comment: social Drug use: Never    Sexual activity: Yes     Partners: Male   Other Topics Concern    Not on file   Social History Narrative    Not on file     Social Determinants of Health     Financial Resource Strain: Not on file   Food Insecurity: Not on file   Transportation Needs: Not on file   Physical Activity: Not on file   Stress: Not on file   Social Connections: Not on file   Intimate Partner Violence: Not on file   Housing Stability: Not on file      Family History   Problem Relation Age of Onset    Breast cancer Mother 76    No Known Problems Maternal Grandmother     No Known Problems Paternal Grandmother     No Known Problems Maternal Aunt      Past Surgical History:   Procedure Laterality Date    BREAST BIOPSY Bilateral benign 20yrs ago    COLONOSCOPY         Current Outpatient Medications:     clindamycin-benzoyl peroxide (BENZACLIN) gel, Apply topically 2 (two) times a day, Disp: , Rfl:     denosumab (Prolia) 60 mg/mL, Inject 60 mg under the skin once, Disp: , Rfl:     ergocalciferol (VITAMIN D2) 50,000 units, TAKE 1 CAPSULE BY MOUTH 2 TIMES PER WEEK, Disp: , Rfl:     famotidine (PEPCID) 40 MG tablet, Take 1 tablet (40 mg total) by mouth daily at bedtime Take in evening 2 hours prior to bed, Disp: 90 tablet, Rfl: 3    metoprolol tartrate (LOPRESSOR) 25 mg tablet, Take 1 tablet by mouth (25 mg) 1 hour prior to coronary CTA, hold for heart rate less than 50 bpm., Disp: 1 tablet, Rfl: 0    omeprazole (PriLOSEC) 20 mg delayed release capsule, Take 1 capsule (20 mg total) by mouth daily, Disp: 90 capsule, Rfl: 4  Allergies   Allergen Reactions    Tetracycline Rash         Labs:  No visits with results within 2 Month(s) from this visit.    Latest known visit with results is:   Appointment on 08/30/2023   Component Date Value    Sodium 08/30/2023 142     Potassium 08/30/2023 4.1     Chloride 08/30/2023 108     CO2 08/30/2023 30     ANION GAP 08/30/2023 4     BUN 08/30/2023 15     Creatinine 08/30/2023 0.76     Glucose, Fasting 08/30/2023 88     Calcium 08/30/2023 9.3     eGFR 08/30/2023 81         Imaging: No results found. Review of Systems:  Review of Systems   Constitutional:  Negative for chills, diaphoresis, fatigue and fever. HENT:  Negative for trouble swallowing and voice change. Eyes:  Negative for pain and redness. Respiratory:  Negative for shortness of breath and wheezing. Cardiovascular:  Negative for chest pain, palpitations and leg swelling. Gastrointestinal:  Negative for abdominal pain, constipation, diarrhea, nausea and vomiting. Genitourinary:  Negative for dysuria. Musculoskeletal:  Positive for arthralgias. Negative for neck pain and neck stiffness. Skin:  Negative for rash. Neurological:  Negative for dizziness, syncope, light-headedness and headaches. Psychiatric/Behavioral:  Negative for agitation and confusion. All other systems reviewed and are negative. Vitals:    11/15/23 1050   BP: 132/68   BP Location: Left arm   Patient Position: Sitting   Cuff Size: Standard   Pulse: 80   Temp: 97.6 °F (36.4 °C)   TempSrc: Temporal   SpO2: 97%   Weight: 77 kg (169 lb 12.8 oz)   Height: 5' 9" (1.753 m)     Vitals:    11/15/23 1050   Weight: 77 kg (169 lb 12.8 oz)     Height: 5' 9" (175.3 cm)     Physical Exam:   Physical Exam  Vitals reviewed. Constitutional:       General: She is not in acute distress. Appearance: Normal appearance. She is not diaphoretic. HENT:      Right Ear: There is no impacted cerumen. Left Ear: There is no impacted cerumen. Eyes:      General:         Right eye: No discharge. Left eye: No discharge. Neck:      Comments: Trachea midline, no JVD present  Cardiovascular:      Rate and Rhythm: Normal rate and regular rhythm. Heart sounds: No friction rub. Pulmonary:      Effort: Pulmonary effort is normal. No respiratory distress. Breath sounds: No wheezing. Chest:      Chest wall: No tenderness.    Abdominal: General: Bowel sounds are normal.      Palpations: Abdomen is soft. Tenderness: There is no abdominal tenderness. There is no rebound. Musculoskeletal:      Right lower leg: No edema. Left lower leg: No edema. Skin:     General: Skin is warm and dry. Neurological:      Mental Status: She is alert. Comments: Awake, alert, able to answer questions appropriately, able to move extremities bilaterally.    Psychiatric:         Mood and Affect: Mood normal.         Behavior: Behavior normal.

## 2023-11-20 ENCOUNTER — TELEPHONE (OUTPATIENT)
Dept: GASTROENTEROLOGY | Facility: CLINIC | Age: 68
End: 2023-11-20

## 2023-11-20 NOTE — TELEPHONE ENCOUNTER
----- Message from [email or web address] sent at 11/17/2023  4:03 PM EST -----    ----- Message -----  From: Costa Hoskins DO  Sent: 11/15/2023   3:12 PM EST  To: Gastro[facility]ology [facility]    Looks like cardiology has cleared patient for EGD.   Thank you  ----- Message -----  From: Rashaun Link DO  Sent: 11/15/2023  11:07 AM EST  To: Adam Mcdonald DO; #

## 2023-11-27 NOTE — TELEPHONE ENCOUNTER
Patient called back and wanted to know if she can have the EGD at the same time she has her Colonoscopy as she prefers to do them together. Please call her to advise.

## 2023-11-28 ENCOUNTER — TELEPHONE (OUTPATIENT)
Age: 68
End: 2023-11-28

## 2023-11-28 NOTE — TELEPHONE ENCOUNTER
LVM for patient to call back if she is having any symptoms that would require a colonoscopy. If the patient is having symptoms, she may have a colon and egd. If she is not having symptoms, she is not due for a screening colonoscopy until 2025 and should just schedule EGD.

## 2023-11-28 NOTE — TELEPHONE ENCOUNTER
Scheduled date of EGD(as of today):1/17/2024  Physician performing EGD:pool  Location of EGD:[facility]  Instructions reviewed with patient by:antonietta   Clearances: none

## 2023-11-30 ENCOUNTER — HOSPITAL ENCOUNTER (OUTPATIENT)
Dept: MAMMOGRAPHY | Facility: HOSPITAL | Age: 68
End: 2023-11-30
Attending: SPECIALIST
Payer: MEDICARE

## 2023-11-30 DIAGNOSIS — Z12.31 ENCOUNTER FOR SCREENING MAMMOGRAM FOR MALIGNANT NEOPLASM OF BREAST: ICD-10-CM

## 2023-11-30 PROCEDURE — 77063 BREAST TOMOSYNTHESIS BI: CPT

## 2023-11-30 PROCEDURE — 77067 SCR MAMMO BI INCL CAD: CPT

## 2023-12-20 ENCOUNTER — TELEPHONE (OUTPATIENT)
Dept: FAMILY MEDICINE CLINIC | Facility: CLINIC | Age: 68
End: 2023-12-20

## 2024-01-17 ENCOUNTER — HOSPITAL ENCOUNTER (OUTPATIENT)
Dept: GASTROENTEROLOGY | Facility: HOSPITAL | Age: 69
Setting detail: OUTPATIENT SURGERY
Discharge: HOME | End: 2024-01-17
Attending: INTERNAL MEDICINE
Payer: MEDICARE

## 2024-01-17 ENCOUNTER — ANESTHESIA EVENT (OUTPATIENT)
Dept: GASTROENTEROLOGY | Facility: HOSPITAL | Age: 69
End: 2024-01-17

## 2024-01-17 ENCOUNTER — ANESTHESIA (OUTPATIENT)
Dept: GASTROENTEROLOGY | Facility: HOSPITAL | Age: 69
End: 2024-01-17

## 2024-01-17 VITALS
TEMPERATURE: 97.2 F | OXYGEN SATURATION: 94 % | HEART RATE: 99 BPM | SYSTOLIC BLOOD PRESSURE: 109 MMHG | DIASTOLIC BLOOD PRESSURE: 63 MMHG | RESPIRATION RATE: 18 BRPM

## 2024-01-17 DIAGNOSIS — K21.00 GASTROESOPHAGEAL REFLUX DISEASE WITH ESOPHAGITIS WITHOUT HEMORRHAGE: ICD-10-CM

## 2024-01-17 DIAGNOSIS — R07.89 CHEST PRESSURE: ICD-10-CM

## 2024-01-17 DIAGNOSIS — R10.13 EPIGASTRIC DISCOMFORT: ICD-10-CM

## 2024-01-17 DIAGNOSIS — R19.8 ABNORMAL FINDINGS ON ESOPHAGOGASTRODUODENOSCOPY (EGD): Primary | ICD-10-CM

## 2024-01-17 PROCEDURE — 88313 SPECIAL STAINS GROUP 2: CPT | Performed by: PATHOLOGY

## 2024-01-17 PROCEDURE — 43239 EGD BIOPSY SINGLE/MULTIPLE: CPT | Performed by: INTERNAL MEDICINE

## 2024-01-17 PROCEDURE — 88341 IMHCHEM/IMCYTCHM EA ADD ANTB: CPT | Performed by: PATHOLOGY

## 2024-01-17 PROCEDURE — 88342 IMHCHEM/IMCYTCHM 1ST ANTB: CPT | Performed by: PATHOLOGY

## 2024-01-17 PROCEDURE — 88305 TISSUE EXAM BY PATHOLOGIST: CPT | Performed by: PATHOLOGY

## 2024-01-17 RX ORDER — GLYCOPYRROLATE 0.2 MG/ML
INJECTION INTRAMUSCULAR; INTRAVENOUS AS NEEDED
Status: DISCONTINUED | OUTPATIENT
Start: 2024-01-17 | End: 2024-01-17

## 2024-01-17 RX ORDER — SODIUM CHLORIDE, SODIUM LACTATE, POTASSIUM CHLORIDE, CALCIUM CHLORIDE 600; 310; 30; 20 MG/100ML; MG/100ML; MG/100ML; MG/100ML
125 INJECTION, SOLUTION INTRAVENOUS CONTINUOUS
Status: DISCONTINUED | OUTPATIENT
Start: 2024-01-17 | End: 2024-01-17

## 2024-01-17 RX ORDER — LIDOCAINE HYDROCHLORIDE 20 MG/ML
INJECTION, SOLUTION EPIDURAL; INFILTRATION; INTRACAUDAL; PERINEURAL AS NEEDED
Status: DISCONTINUED | OUTPATIENT
Start: 2024-01-17 | End: 2024-01-17

## 2024-01-17 RX ORDER — OMEPRAZOLE 40 MG/1
40 CAPSULE, DELAYED RELEASE ORAL DAILY
Qty: 90 CAPSULE | Refills: 4 | Status: SHIPPED | OUTPATIENT
Start: 2024-01-17

## 2024-01-17 RX ORDER — PROPOFOL 10 MG/ML
INJECTION, EMULSION INTRAVENOUS AS NEEDED
Status: DISCONTINUED | OUTPATIENT
Start: 2024-01-17 | End: 2024-01-17

## 2024-01-17 RX ADMIN — PROPOFOL 50 MG: 10 INJECTION, EMULSION INTRAVENOUS at 07:59

## 2024-01-17 RX ADMIN — LIDOCAINE HYDROCHLORIDE 40 MG: 20 INJECTION, SOLUTION EPIDURAL; INFILTRATION; INTRACAUDAL; PERINEURAL at 07:57

## 2024-01-17 RX ADMIN — PROPOFOL 50 MG: 10 INJECTION, EMULSION INTRAVENOUS at 08:05

## 2024-01-17 RX ADMIN — PROPOFOL 50 MG: 10 INJECTION, EMULSION INTRAVENOUS at 08:02

## 2024-01-17 RX ADMIN — GLYCOPYRROLATE 0.2 MG: 0.2 INJECTION, SOLUTION INTRAMUSCULAR; INTRAVENOUS at 07:56

## 2024-01-17 RX ADMIN — TOPICAL ANESTHETIC 1 SPRAY: 200 SPRAY DENTAL; PERIODONTAL at 07:58

## 2024-01-17 RX ADMIN — SODIUM CHLORIDE, SODIUM LACTATE, POTASSIUM CHLORIDE, AND CALCIUM CHLORIDE 125 ML/HR: .6; .31; .03; .02 INJECTION, SOLUTION INTRAVENOUS at 07:46

## 2024-01-17 RX ADMIN — PROPOFOL 100 MG: 10 INJECTION, EMULSION INTRAVENOUS at 07:57

## 2024-01-17 RX ADMIN — SODIUM CHLORIDE, SODIUM LACTATE, POTASSIUM CHLORIDE, AND CALCIUM CHLORIDE: .6; .31; .03; .02 INJECTION, SOLUTION INTRAVENOUS at 07:45

## 2024-01-17 NOTE — ANESTHESIA PREPROCEDURE EVALUATION
Procedure:  EGD    Relevant Problems   GI/HEPATIC   (+) GERD (gastroesophageal reflux disease)      Past Medical History:   Diagnosis Date    BRCA1 negative     BRCA2 negative     Hepatitis C          Physical Exam    Airway    Mallampati score: II  TM Distance: >3 FB  Neck ROM: full     Dental   No notable dental hx     Cardiovascular  Rhythm: regular, Rate: normal    Pulmonary   Breath sounds clear to auscultation    Other Findings  Intercisor Distance > 3cm    post-pubertal.      Anesthesia Plan  ASA Score- 2     Anesthesia Type- IV sedation with anesthesia with ASA Monitors.         Additional Monitors:     Airway Plan:     Comment: NPO appropriate. Discussed benefits/risks of monitored anesthetic care which involves providing a dynamic level of mild to deep sedation. Complications include awareness and/or airway obstruction/aspiration which may necessitate conversion to general anesthesia. All questions answered. Patient understands and wishes to proceed. .       Plan Factors-Exercise tolerance (METS): >4 METS.    Chart reviewed. EKG reviewed.  Existing labs reviewed.                   Induction-     Postoperative Plan- Plan for postoperative opioid use.     Informed Consent- Anesthetic plan and risks discussed with patient.  I personally reviewed this patient with the CRNA. Discussed and agreed on the Anesthesia Plan with the CRNA..

## 2024-01-17 NOTE — ANESTHESIA POSTPROCEDURE EVALUATION
Post-Op Assessment Note    CV Status:  Stable  Pain Score: 0    Pain management: adequate       Mental Status:  Arousable   Hydration Status:  Stable   PONV Controlled:  None   Airway Patency:  Patent     Post Op Vitals Reviewed: Yes      Staff: CRNA           BP   99/56   Temp      Pulse  97   Resp   18   SpO2   96%   
(4) no limitation

## 2024-01-17 NOTE — H&P
History and Physical - [facility]  Lily Zhang 68 y.o. female MRN: [id number]                  HPI: Lily Zhang is a 68 y.o. year old female who presents for for EGD.  I initially met Lily in July 2023.  She was reporting chest pressure at that time.  This was waking her from sleep a couple nights a week.  It was rating through her back and jaw.  She did see cardiology.  Did not feel this chest pain was cardiac.  She has found the use of omeprazole and famotidine to limit these episodes.  She denies any dysphagia nausea vomiting or early satiety.    Her bowel habits are improved and she is no longer experiencing diarrhea.  She did have a mildly low fecal elastase.  Her fecal fat was normal.  At this point she is holding off on starting pancreatic enzymes as her bowel habits are significantly improved.    She does not smoke tobacco.  She rarely drinks alcohol.    Her father may have had colon polyps.  Dad had peptic ulcer disease.  Mom had breast cancer.    Paternal uncle had colon cancer at age 68.      REVIEW OF SYSTEMS: Per the HPI, and otherwise unremarkable.    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     Hepatitis C      Past Surgical History:   Procedure Laterality Date    BREAST BIOPSY Bilateral benign 20yrs ago    COLONOSCOPY       Social History   Social History     Substance and Sexual Activity   Alcohol Use Yes    Comment: social     Social History     Substance and Sexual Activity   Drug Use Never     Social History     Tobacco Use   Smoking Status Former   Smokeless Tobacco Never     Family History   Problem Relation Age of Onset    Breast cancer Mother 75    No Known Problems Maternal Grandmother     No Known Problems Paternal Grandmother     No Known Problems Maternal Aunt        Meds/Allergies       Current Outpatient Medications:     clindamycin-benzoyl peroxide (BENZACLIN) gel    ergocalciferol (VITAMIN D2)  50,000 units    famotidine (PEPCID) 40 MG tablet    omeprazole (PriLOSEC) 20 mg delayed release capsule    denosumab (Prolia) 60 mg/mL    metoprolol tartrate (LOPRESSOR) 25 mg tablet    Current Facility-Administered Medications:     benzocaine (HURRICAINE) 20 % mucosal spray, , Mucosal, Once    lactated ringers infusion, 125 mL/hr, Intravenous, Continuous, Continue from Pre-op at 01/17/24 0754    Allergies   Allergen Reactions    Tetracycline Rash       Objective     /60   Pulse 87   Temp 97.6 °F (36.4 °C) (Temporal)   Resp 18   SpO2 97%       PHYSICAL EXAM    Gen: NAD  Head: NCAT  CV: RRR  CHEST: Clear  ABD: soft, NT/ND  EXT: no edema      ASSESSMENT/PLAN:  This is a 68 y.o. year old female here for EGD, and she is stable and optimized for her procedure.

## 2024-01-17 NOTE — DISCHARGE INSTRUCTIONS
..Upper Endoscopy   WHAT YOU NEED TO KNOW:   An upper endoscopy is also called an upper gastrointestinal (GI) endoscopy, or an esophagogastroduodenoscopy (EGD). It is a procedure to examine the inside of your esophagus, stomach, and duodenum (first part of the small intestine) with a scope. You may feel bloated, gassy, or have some abdominal discomfort after your procedure. Your throat may be sore for 24 to 36 hours. You may burp or pass gas from air that is still inside your body.         DISCHARGE INSTRUCTIONS:   Seek care immediately if:   You have sudden, severe abdominal pain.     You have problems swallowing.     You have a large amount of black, sticky bowel movements or blood in your bowel movements.     You have sudden trouble breathing.     You feel weak, lightheaded, or faint or your heart beats faster than normal for you.     Contact your healthcare provider if:   You have a fever and chills.      You have nausea or are vomiting.      Your abdomen is bloated or feels full and hard.     You have abdominal pain.   You have black, sticky bowel movements or blood in your bowel movements.  You have not had a bowel movement for 3 days after your procedure.  You have rash or hives.  You have questions or concerns about your procedure.    Activity:   Do not lift, strain, or run for 24 hours after your procedure.     Rest after your procedure. You have been given medicine to relax you. Do not drive or make important decisions until the day after your procedure. Return to your normal activity as directed.     Relieve gas and discomfort from bloating by lying on your right side with a heating pad on your abdomen. You may need to take short walks to help the gas move out. Eat small meals until bloating is relieved.  Follow up with your healthcare provider as directed: Write down your questions so you remember to ask them during your visits.     If you take a “blood thinner”, please review the specific instructions  from your endoscopist about when you should resume it. These can be found in the “Recommendation” and “Your Medication list” sections of this After Visit Summary.

## 2024-01-22 PROCEDURE — 88341 IMHCHEM/IMCYTCHM EA ADD ANTB: CPT | Performed by: PATHOLOGY

## 2024-01-22 PROCEDURE — 88313 SPECIAL STAINS GROUP 2: CPT | Performed by: PATHOLOGY

## 2024-01-22 PROCEDURE — 88305 TISSUE EXAM BY PATHOLOGIST: CPT | Performed by: PATHOLOGY

## 2024-01-22 PROCEDURE — 88342 IMHCHEM/IMCYTCHM 1ST ANTB: CPT | Performed by: PATHOLOGY

## 2024-01-23 NOTE — RESULT ENCOUNTER NOTE
Please set patient up for follow-up office visit with me for about 4 months.  I am okay with locking and an EGD for about 5-1/2 to 6 months with me as well.  I did order a CT scan of the abdomen pelvis after her EGD, please see if she needs any assistance scheduling it.  I do not believe she heard back from radiology yet.    I informed patient that biopsies from the duodenum are benign and negative for celiac disease.  Biopsies of stomach were benign and negative for bacteria called H. 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  The pathologist stated cannot exclude HPV effect.  I communicated with the pathologist and he suggest managing this as any other esophageal squamous papilloma.  This will be reevaluated at time of repeat endoscopy as well.  Thank you

## 2024-01-25 ENCOUNTER — PREP FOR PROCEDURE (OUTPATIENT)
Age: 69
End: 2024-01-25

## 2024-01-25 ENCOUNTER — NURSE TRIAGE (OUTPATIENT)
Age: 69
End: 2024-01-25

## 2024-01-25 ENCOUNTER — TELEPHONE (OUTPATIENT)
Dept: GASTROENTEROLOGY | Facility: CLINIC | Age: 69
End: 2024-01-25

## 2024-01-25 DIAGNOSIS — R19.8 ABNORMAL FINDINGS ON ESOPHAGOGASTRODUODENOSCOPY (EGD): ICD-10-CM

## 2024-01-25 DIAGNOSIS — R10.13 EPIGASTRIC DISCOMFORT: Primary | ICD-10-CM

## 2024-01-25 NOTE — TELEPHONE ENCOUNTER
"PT INFORMED CT SCAN ABDOMEN/PELVIS WITH PO AND IV CONTRAST. ALL QUESTIONS ANSWERED.         Reason for Disposition   Information only question and nurse able to answer    Answer Assessment - Initial Assessment Questions  1. REASON FOR CALL or QUESTION: \"What is your reason for calling today?\" or \"How can I best help you?\" or \"What question do you have that I can help answer?\"      PT CALLING ABOUT CT SCAN CONTRAST REQUIREMENTS.    Protocols used: Information Only Call - No Triage-ADULT-[place]    "

## 2024-01-25 NOTE — TELEPHONE ENCOUNTER
Patient returned phone call. Office visit scheduled for 5/30/24. Patient will be contacting central scheduling to schedule CT.     Scheduled date of EGD(as of today): 7/9/24  Physician performing EGD:   Location of EGD: [facility]  Instructions reviewed with patient by:md  Clearances:

## 2024-01-31 ENCOUNTER — HOSPITAL ENCOUNTER (OUTPATIENT)
Dept: CT IMAGING | Facility: HOSPITAL | Age: 69
Discharge: HOME | End: 2024-01-31
Attending: INTERNAL MEDICINE
Payer: MEDICARE

## 2024-01-31 DIAGNOSIS — R10.13 EPIGASTRIC DISCOMFORT: ICD-10-CM

## 2024-01-31 DIAGNOSIS — R19.8 ABNORMAL FINDINGS ON ESOPHAGOGASTRODUODENOSCOPY (EGD): ICD-10-CM

## 2024-01-31 PROCEDURE — G1004 CDSM NDSC: HCPCS

## 2024-01-31 PROCEDURE — 74177 CT ABD & PELVIS W/CONTRAST: CPT

## 2024-01-31 RX ADMIN — IOHEXOL 100 ML: 350 INJECTION, SOLUTION INTRAVENOUS at 12:48

## 2024-02-01 NOTE — RESULT ENCOUNTER NOTE
Please inform patientthat CAT scan did not reveal any mass outside the stomach pushing on it, this is good news.  I suspect the finding at endoscopy is a variation of normal.  There is a small hiatal hernia is noted at time of endoscopy.  She does have a uterine fibroid.  There is diverticulosis in the colon without any evidence of inflammation.  There is a benign cyst in the spleen that does not require any follow-up.  Thank you

## 2024-02-20 ENCOUNTER — OFFICE VISIT (OUTPATIENT)
Dept: FAMILY MEDICINE CLINIC | Facility: CLINIC | Age: 69
End: 2024-02-20
Payer: MEDICARE

## 2024-02-20 ENCOUNTER — APPOINTMENT (OUTPATIENT)
Age: 69
End: 2024-02-20
Payer: MEDICARE

## 2024-02-20 DIAGNOSIS — K44.9 HIATAL HERNIA WITH GERD WITHOUT ESOPHAGITIS: ICD-10-CM

## 2024-02-20 DIAGNOSIS — Z13.6 ENCOUNTER FOR LIPID SCREENING FOR CARDIOVASCULAR DISEASE: ICD-10-CM

## 2024-02-20 DIAGNOSIS — R73.9 ELEVATED BLOOD SUGAR: ICD-10-CM

## 2024-02-20 DIAGNOSIS — M81.0 AGE-RELATED OSTEOPOROSIS WITHOUT CURRENT PATHOLOGICAL FRACTURE: ICD-10-CM

## 2024-02-20 DIAGNOSIS — R53.83 OTHER FATIGUE: ICD-10-CM

## 2024-02-20 DIAGNOSIS — R63.5 WEIGHT GAIN: ICD-10-CM

## 2024-02-20 DIAGNOSIS — K21.9 HIATAL HERNIA WITH GERD WITHOUT ESOPHAGITIS: ICD-10-CM

## 2024-02-20 DIAGNOSIS — Z13.220 ENCOUNTER FOR LIPID SCREENING FOR CARDIOVASCULAR DISEASE: ICD-10-CM

## 2024-02-20 DIAGNOSIS — Z00.00 MEDICARE ANNUAL WELLNESS VISIT, SUBSEQUENT: Primary | ICD-10-CM

## 2024-02-20 DIAGNOSIS — K57.90 DIVERTICULOSIS: ICD-10-CM

## 2024-02-20 PROBLEM — R19.5 POSITIVE COLORECTAL CANCER SCREENING USING COLOGUARD TEST: Status: RESOLVED | Noted: 2022-01-26 | Resolved: 2024-02-20

## 2024-02-20 LAB
ALBUMIN SERPL BCP-MCNC: 4.4 G/DL (ref 3.5–5)
ALP SERPL-CCNC: 46 U/L (ref 34–104)
ALT SERPL W P-5'-P-CCNC: 16 U/L (ref 7–52)
ANION GAP SERPL CALCULATED.3IONS-SCNC: 7 MMOL/L
AST SERPL W P-5'-P-CCNC: 17 U/L (ref 13–39)
BASOPHILS # BLD AUTO: 0.07 THOUSANDS/ÂΜL (ref 0–0.1)
BASOPHILS NFR BLD AUTO: 1 % (ref 0–1)
BILIRUB SERPL-MCNC: 0.73 MG/DL (ref 0.2–1)
BUN SERPL-MCNC: 13 MG/DL (ref 5–25)
CALCIUM SERPL-MCNC: 9.7 MG/DL (ref 8.4–10.2)
CHLORIDE SERPL-SCNC: 106 MMOL/L (ref 96–108)
CHOLEST SERPL-MCNC: 218 MG/DL
CO2 SERPL-SCNC: 29 MMOL/L (ref 21–32)
CREAT SERPL-MCNC: 0.76 MG/DL (ref 0.6–1.3)
EOSINOPHIL # BLD AUTO: 0.21 THOUSAND/ÂΜL (ref 0–0.61)
EOSINOPHIL NFR BLD AUTO: 3 % (ref 0–6)
ERYTHROCYTE [DISTWIDTH] IN BLOOD BY AUTOMATED COUNT: 12.2 % (ref 11.6–15.1)
EST. AVERAGE GLUCOSE BLD GHB EST-MCNC: 105 MG/DL
GFR SERPL CREATININE-BSD FRML MDRD: 81 ML/MIN/1.73SQ M
GLUCOSE P FAST SERPL-MCNC: 82 MG/DL (ref 65–99)
HBA1C MFR BLD: 5.3 %
HCT VFR BLD AUTO: 48 % (ref 34.8–46.1)
HDLC SERPL-MCNC: 78 MG/DL
HGB BLD-MCNC: 14.8 G/DL (ref 11.5–15.4)
IMM GRANULOCYTES # BLD AUTO: 0.02 THOUSAND/UL (ref 0–0.2)
IMM GRANULOCYTES NFR BLD AUTO: 0 % (ref 0–2)
LDLC SERPL CALC-MCNC: 121 MG/DL (ref 0–100)
LYMPHOCYTES # BLD AUTO: 1.98 THOUSANDS/ÂΜL (ref 0.6–4.47)
LYMPHOCYTES NFR BLD AUTO: 30 % (ref 14–44)
MCH RBC QN AUTO: 30.9 PG (ref 26.8–34.3)
MCHC RBC AUTO-ENTMCNC: 30.8 G/DL (ref 31.4–37.4)
MCV RBC AUTO: 100 FL (ref 82–98)
MONOCYTES # BLD AUTO: 0.45 THOUSAND/ÂΜL (ref 0.17–1.22)
MONOCYTES NFR BLD AUTO: 7 % (ref 4–12)
NEUTROPHILS # BLD AUTO: 3.97 THOUSANDS/ÂΜL (ref 1.85–7.62)
NEUTS SEG NFR BLD AUTO: 59 % (ref 43–75)
NRBC BLD AUTO-RTO: 0 /100 WBCS
PLATELET # BLD AUTO: 334 THOUSANDS/UL (ref 149–390)
PMV BLD AUTO: 10.7 FL (ref 8.9–12.7)
POTASSIUM SERPL-SCNC: 4.1 MMOL/L (ref 3.5–5.3)
PROT SERPL-MCNC: 7.2 G/DL (ref 6.4–8.4)
RBC # BLD AUTO: 4.79 MILLION/UL (ref 3.81–5.12)
SODIUM SERPL-SCNC: 142 MMOL/L (ref 135–147)
TRIGL SERPL-MCNC: 97 MG/DL
TSH SERPL DL<=0.05 MIU/L-ACNC: 2 UIU/ML (ref 0.45–4.5)
WBC # BLD AUTO: 6.7 THOUSAND/UL (ref 4.31–10.16)

## 2024-02-20 PROCEDURE — 83036 HEMOGLOBIN GLYCOSYLATED A1C: CPT

## 2024-02-20 PROCEDURE — 85025 COMPLETE CBC W/AUTO DIFF WBC: CPT

## 2024-02-20 PROCEDURE — 36415 COLL VENOUS BLD VENIPUNCTURE: CPT

## 2024-02-20 PROCEDURE — 80061 LIPID PANEL: CPT

## 2024-02-20 PROCEDURE — 80053 COMPREHEN METABOLIC PANEL: CPT

## 2024-02-20 PROCEDURE — G0438 PPPS, INITIAL VISIT: HCPCS | Performed by: INTERNAL MEDICINE

## 2024-02-20 PROCEDURE — 84443 ASSAY THYROID STIM HORMONE: CPT

## 2024-02-20 NOTE — PROGRESS NOTES
Assessment and Plan:     Problem List Items Addressed This Visit       Hiatal hernia with GERD without esophagitis     Patient noticed symptoms mostly after drinking red wine.  She stopped drinking red wine and no longer has this issue.  Remains on omeprazole as well as famotidine at this point.  Follows with GI.         Diverticulosis     Had a positive Cologuard, colonoscopy only revealed diverticulosis.  She also had a few polyps which she will follow with GI regularly for.         Medicare annual wellness visit, subsequent - Primary     Reviewed the AWV questionnaire.  Went through standard need for appropriate screening tests based on risk factors..  Reviewed cognitive issues.  Reviewed living will and DURABLE POWER OF .  Discussed healthy lifestyle, healthy diet.  Reviewed vaccines.  Encourage exercise.  Discussed safety issues within the home and about.          Age-related osteoporosis without current pathological fracture     Remains on vitamin D over-the-counter as well as every 6 month Prolia injections.  Will recheck bone density after 5 years of therapy.          Other Visit Diagnoses       Encounter for lipid screening for cardiovascular disease        Relevant Orders    Lipid Panel with Direct LDL reflex    TSH, 3rd generation    Elevated blood sugar        Relevant Orders    CBC and differential    Comprehensive metabolic panel    Hemoglobin A1C    Weight gain        Relevant Orders    CBC and differential    Comprehensive metabolic panel    Other fatigue        Relevant Orders    TSH, 3rd generation            Depression Screening and Follow-up Plan: Patient was screened for depression during today's encounter. They screened negative with a PHQ-2 score of 0.      Preventive health issues were discussed with patient, and age appropriate screening tests were ordered as noted in patient's After Visit Summary.  Personalized health advice and appropriate referrals for health education or  preventive services given if needed, as noted in patient's After Visit Summary.     History of Present Illness:     Patient presents for a Medicare Wellness Visit    Patient presents for their annual medical wellness visit.  Reviewed medical intake questionnaire.  Discussed living will and DURABLE POWER OF .  Discussed importance of these 2 documents.  Reviewed the various screening modalities the patient was due for.  Reviewed home safety as well as depression and risk of falling.  Through shared medical decision making move forward with testing or blood work as ordered.  The patient presents with follow-up with GERD which she saw GI extensively.  Had both colon and upper GI done..        Patient Care Team:  Todd Quintanilla DO as PCP - General (Emergency Medicine)  Chato Catherine DO (Gastroenterology)  oTmmie Hair DO (Cardiology)     Review of Systems:     Review of Systems   Constitutional:  Positive for unexpected weight change. Negative for chills and fever.        Some weight gain   HENT:  Negative for rhinorrhea and sore throat.    Eyes:  Negative for visual disturbance.   Respiratory:  Negative for cough and shortness of breath.    Cardiovascular:  Negative for chest pain and leg swelling.   Gastrointestinal:  Negative for abdominal pain, diarrhea, nausea and vomiting.   Genitourinary:  Negative for dysuria.   Musculoskeletal:  Positive for arthralgias and back pain. Negative for myalgias.   Skin:  Negative for rash.   Neurological:  Negative for dizziness and headaches.   Psychiatric/Behavioral:  Negative for confusion.    All other systems reviewed and are negative.       Problem List:     Patient Active Problem List   Diagnosis    History of hepatitis C    Intermittent diarrhea    Elevated MCV    Chest pressure    Epigastric discomfort    Hiatal hernia with GERD without esophagitis    History of colon polyps    Diverticulosis    Medicare annual wellness visit, subsequent     Age-related osteoporosis without current pathological fracture      Past Medical and Surgical History:     Past Medical History:   Diagnosis Date    BRCA1 negative     BRCA2 negative     Hepatitis C     Positive colorectal cancer screening using Cologuard test      Past Surgical History:   Procedure Laterality Date    BREAST BIOPSY Bilateral benign 20yrs ago    COLONOSCOPY        Family History:     Family History   Problem Relation Age of Onset    Breast cancer Mother 75    No Known Problems Maternal Grandmother     No Known Problems Paternal Grandmother     No Known Problems Maternal Aunt       Social History:     Social History     Socioeconomic History    Marital status:      Spouse name: None    Number of children: None    Years of education: None    Highest education level: None   Occupational History    None   Tobacco Use    Smoking status: Former    Smokeless tobacco: Never   Vaping Use    Vaping status: Never Used   Substance and Sexual Activity    Alcohol use: Yes     Comment: social    Drug use: Never    Sexual activity: Yes     Partners: Male   Other Topics Concern    None   Social History Narrative    None     Social Determinants of Health     Financial Resource Strain: Low Risk  (2/13/2024)    Overall Financial Resource Strain (CARDIA)     Difficulty of Paying Living Expenses: Not hard at all   Food Insecurity: Not on file   Transportation Needs: No Transportation Needs (2/13/2024)    PRAPARE - Transportation     Lack of Transportation (Medical): No     Lack of Transportation (Non-Medical): No   Physical Activity: Not on file   Stress: Not on file   Social Connections: Not on file   Intimate Partner Violence: Not on file   Housing Stability: Not on file      Medications and Allergies:     Current Outpatient Medications   Medication Sig Dispense Refill    denosumab (Prolia) 60 mg/mL Inject 60 mg under the skin once      famotidine (PEPCID) 40 MG tablet Take 1 tablet (40 mg total) by mouth daily  at bedtime Take in evening 2 hours prior to bed 90 tablet 3    omeprazole (PriLOSEC) 40 MG capsule Take 1 capsule (40 mg total) by mouth daily Take 30 minutes to 1 hour before 1 meal a day 90 capsule 4     No current facility-administered medications for this visit.     Allergies   Allergen Reactions    Tetracycline Rash      Immunizations:     Immunization History   Administered Date(s) Administered    COVID-19 MODERNA VACC 0.5 ML IM 12/21/2021    COVID-19 PFIZER VACCINE 0.3 ML IM 03/22/2021, 04/14/2021, 11/05/2021    COVID-19 Pfizer mRNA vacc PF gilberto-sucrose 12 yr and older (Comirnaty) 11/01/2023    COVID-19 Pfizer vac (Gilberto-sucrose, gray cap) 12 yr+ IM 12/21/2022    INFLUENZA 10/12/2019, 10/24/2020, 10/14/2021, 11/21/2022, 11/01/2023    Influenza, Seasonal Vaccine, Quadrivalent, Adjuvanted, .5e 10/14/2021, 11/21/2022, 11/01/2023    Pneumococcal Conjugate 13-Valent 01/04/2022    Pneumococcal Conjugate Vaccine 20-valent (Pcv20), Polysace 02/14/2023    Tdap 05/18/2017    Zoster Vaccine Recombinant 12/13/2018, 02/13/2019      Health Maintenance:         Topic Date Due    Colorectal Cancer Screening  03/10/2025    Breast Cancer Screening: Mammogram  11/30/2025    Hepatitis C Screening  Discontinued         Topic Date Due    COVID-19 Vaccine (7 - 2023-24 season) 12/27/2023      Medicare Screening Tests and Risk Assessments:     Lily HYATT is here for her Subsequent Wellness visit. Last Medicare Wellness visit information reviewed, patient interviewed, no change since last AWV.     Health Risk Assessment:   Patient rates overall health as very good. Patient feels that their physical health rating is same. Patient is satisfied with their life. Eyesight was rated as same. Hearing was rated as same. Patient feels that their emotional and mental health rating is slightly better. Patients states they are sometimes angry. Patient states they are sometimes unusually tired/fatigued. Pain experienced in the last 7 days has been  none. Patient states that she has experienced weight loss or gain in last 6 months.     Depression Screening:   PHQ-2 Score: 0      Fall Risk Screening:   In the past year, patient has experienced: no history of falling in past year      Urinary Incontinence Screening:   Patient has not leaked urine accidently in the last six months.     Home Safety:  Patient does not have trouble with stairs inside or outside of their home. Patient has working smoke alarms and has working carbon monoxide detector. Home safety hazards include: none.     Nutrition:   Current diet is Low Cholesterol, Low Carb and No Added Salt.     Medications:   Patient is currently taking over-the-counter supplements. OTC medications include: see medication list. Patient is able to manage medications.     Activities of Daily Living (ADLs)/Instrumental Activities of Daily Living (IADLs):   Walk and transfer into and out of bed and chair?: Yes  Dress and groom yourself?: Yes    Bathe or shower yourself?: Yes    Feed yourself? Yes  Do your laundry/housekeeping?: Yes  Manage your money, pay your bills and track your expenses?: Yes  Make your own meals?: Yes    Do your own shopping?: Yes    Previous Hospitalizations:   Any hospitalizations or ED visits within the last 12 months?: No      Advance Care Planning:   Living will: Yes    Durable POA for healthcare: Yes    Advanced directive: Yes    Advanced directive counseling given: Yes    ACP document given: Yes    Patient declined ACP directive: No    End of Life Decisions reviewed with patient: Yes    Provider agrees with end of life decisions: Yes      Cognitive Screening:   Provider or family/friend/caregiver concerned regarding cognition?: No    PREVENTIVE SCREENINGS      Cardiovascular Screening:    General: Screening Not Indicated and History Lipid Disorder      Diabetes Screening:     General: Screening Current      Colorectal Cancer Screening:     General: Screening Current      Breast Cancer  "Screening:     General: Screening Current      Cervical Cancer Screening:    General: Screening Not Indicated      Osteoporosis Screening:    General: Screening Not Indicated and History Osteoporosis      Abdominal Aortic Aneurysm (AAA) Screening:        General: Screening Not Indicated      Lung Cancer Screening:     General: Screening Not Indicated      Hepatitis C Screening:    General: Screening Not Indicated and History Hepatitis C    Screening, Brief Intervention, and Referral to Treatment (SBIRT)    Screening  Typical number of drinks in a day: 0  Typical number of drinks in a week: 0  Interpretation: Low risk drinking behavior.    AUDIT-C Screenin) How often did you have a drink containing alcohol in the past year? monthly or less  2) How many drinks did you have on a typical day when you were drinking in the past year? 1 to 2  3) How often did you have 6 or more drinks on one occasion in the past year? never    AUDIT-C Score: 1  Interpretation: Score 0-2 (female): Negative screen for alcohol misuse    Single Item Drug Screening:  How often have you used an illegal drug (including marijuana) or a prescription medication for non-medical reasons in the past year? never    Single Item Drug Screen Score: 0  Interpretation: Negative screen for possible drug use disorder    Other Counseling Topics:   Car/seat belt/driving safety and calcium and vitamin D intake and regular weightbearing exercise.     No results found.     Physical Exam:     /78 (BP Location: Right arm, Patient Position: Sitting, Cuff Size: Standard)   Pulse 68   Temp 98.7 °F (37.1 °C) (Tympanic)   Ht 5' 9\" (1.753 m)   Wt 89.4 kg (197 lb)   SpO2 98%   BMI 29.09 kg/m²     Physical Exam  Vitals and nursing note reviewed.   Constitutional:       General: She is not in acute distress.     Appearance: Normal appearance. She is well-developed.   HENT:      Head: Normocephalic and atraumatic.   Eyes:      Conjunctiva/sclera: Conjunctivae " normal.   Cardiovascular:      Rate and Rhythm: Normal rate and regular rhythm.      Heart sounds: No murmur heard.  Pulmonary:      Effort: Pulmonary effort is normal. No respiratory distress.      Breath sounds: Normal breath sounds.   Abdominal:      Palpations: Abdomen is soft.      Tenderness: There is no abdominal tenderness.   Musculoskeletal:         General: No swelling.      Cervical back: Neck supple.   Skin:     General: Skin is warm and dry.      Capillary Refill: Capillary refill takes less than 2 seconds.   Neurological:      Mental Status: She is alert.   Psychiatric:         Mood and Affect: Mood normal.         Behavior: Behavior normal.          Todd Quintanilla, DO

## 2024-02-20 NOTE — PATIENT INSTRUCTIONS
Medicare Preventive Visit Patient Instructions  Thank you for completing your Welcome to Medicare Visit or Medicare Annual Wellness Visit today. Your next wellness visit will be due in one year (2/20/2025).  The screening/preventive services that you may require over the next 5-10 years are detailed below. Some tests may not apply to you based off risk factors and/or age. Screening tests ordered at today's visit but not completed yet may show as past due. Also, please note that scanned in results may not display below.  Preventive Screenings:  Service Recommendations Previous Testing/Comments   Colorectal Cancer Screening  * Colonoscopy    * Fecal Occult Blood Test (FOBT)/Fecal Immunochemical Test (FIT)  * Fecal DNA/Cologuard Test  * Flexible Sigmoidoscopy Age: 45-75 years old   Colonoscopy: every 10 years (may be performed more frequently if at higher risk)  OR  FOBT/FIT: every 1 year  OR  Cologuard: every 3 years  OR  Sigmoidoscopy: every 5 years  Screening may be recommended earlier than age 45 if at higher risk for colorectal cancer. Also, an individualized decision between you and your healthcare provider will decide whether screening between the ages of 76-85 would be appropriate. Colonoscopy: 03/10/2022  FOBT/FIT: 03/10/2022  Cologuard: 03/10/2022  Sigmoidoscopy: 03/10/2022          Breast Cancer Screening Age: 40+ years old  Frequency: every 1-2 years  Not required if history of left and right mastectomy Mammogram: 11/30/2023        Cervical Cancer Screening Between the ages of 21-29, pap smear recommended once every 3 years.   Between the ages of 30-65, can perform pap smear with HPV co-testing every 5 years.   Recommendations may differ for women with a history of total hysterectomy, cervical cancer, or abnormal pap smears in past. Pap Smear: Not on file        Hepatitis C Screening Once for adults born between 1945 and 1965  More frequently in patients at high risk for Hepatitis C Hep C Antibody:  01/26/2022        Diabetes Screening 1-2 times per year if you're at risk for diabetes or have pre-diabetes Fasting glucose: 88 mg/dL (8/30/2023)  A1C: 5.1 % (2/14/2023)      Cholesterol Screening Once every 5 years if you don't have a lipid disorder. May order more often based on risk factors. Lipid panel: 02/14/2023          Other Preventive Screenings Covered by Medicare:  Abdominal Aortic Aneurysm (AAA) Screening: covered once if your at risk. You're considered to be at risk if you have a family history of AAA.  Lung Cancer Screening: covers low dose CT scan once per year if you meet all of the following conditions: (1) Age 55-77; (2) No signs or symptoms of lung cancer; (3) Current smoker or have quit smoking within the last 15 years; (4) You have a tobacco smoking history of at least 20 pack years (packs per day multiplied by number of years you smoked); (5) You get a written order from a healthcare provider.  Glaucoma Screening: covered annually if you're considered high risk: (1) You have diabetes OR (2) Family history of glaucoma OR (3)  aged 50 and older OR (4)  American aged 65 and older  Osteoporosis Screening: covered every 2 years if you meet one of the following conditions: (1) You're estrogen deficient and at risk for osteoporosis based off medical history and other findings; (2) Have a vertebral abnormality; (3) On glucocorticoid therapy for more than 3 months; (4) Have primary hyperparathyroidism; (5) On osteoporosis medications and need to assess response to drug therapy.   Last bone density test (DXA Scan): 11/10/2022.  HIV Screening: covered annually if you're between the age of 15-65. Also covered annually if you are younger than 15 and older than 65 with risk factors for HIV infection. For pregnant patients, it is covered up to 3 times per pregnancy.    Immunizations:  Immunization Recommendations   Influenza Vaccine Annual influenza vaccination during flu season is  recommended for all persons aged >= 6 months who do not have contraindications   Pneumococcal Vaccine   * Pneumococcal conjugate vaccine = PCV13 (Prevnar 13), PCV15 (Vaxneuvance), PCV20 (Prevnar 20)  * Pneumococcal polysaccharide vaccine = PPSV23 (Pneumovax) Adults 19-65 yo with certain risk factors or if 65+ yo  If never received any pneumonia vaccine: recommend Prevnar 20 (PCV20)  Give PCV20 if previously received 1 dose of PCV13 or PPSV23   Hepatitis B Vaccine 3 dose series if at intermediate or high risk (ex: diabetes, end stage renal disease, liver disease)   Respiratory syncytial virus (RSV) Vaccine - COVERED BY MEDICARE PART D  * RSVPreF3 (Arexvy) CDC recommends that adults 60 years of age and older may receive a single dose of RSV vaccine using shared clinical decision-making (SCDM)   Tetanus (Td) Vaccine - COST NOT COVERED BY MEDICARE PART B Following completion of primary series, a booster dose should be given every 10 years to maintain immunity against tetanus. Td may also be given as tetanus wound prophylaxis.   Tdap Vaccine - COST NOT COVERED BY MEDICARE PART B Recommended at least once for all adults. For pregnant patients, recommended with each pregnancy.   Shingles Vaccine (Shingrix) - COST NOT COVERED BY MEDICARE PART B  2 shot series recommended in those 19 years and older who have or will have weakened immune systems or those 50 years and older     Health Maintenance Due:      Topic Date Due   • Colorectal Cancer Screening  03/10/2025   • Breast Cancer Screening: Mammogram  11/30/2025   • Hepatitis C Screening  Discontinued     Immunizations Due:      Topic Date Due   • Hepatitis B Vaccine (1 of 3 - Risk 3-dose series) Never done   • COVID-19 Vaccine (7 - 2023-24 season) 12/27/2023     Advance Directives   What are advance directives?  Advance directives are legal documents that state your wishes and plans for medical care. These plans are made ahead of time in case you lose your ability to make  decisions for yourself. Advance directives can apply to any medical decision, such as the treatments you want, and if you want to donate organs.   What are the types of advance directives?  There are many types of advance directives, and each state has rules about how to use them. You may choose a combination of any of the following:  Living will:  This is a written record of the treatment you want. You can also choose which treatments you do not want, which to limit, and which to stop at a certain time. This includes surgery, medicine, IV fluid, and tube feedings.   Durable power of  for healthcare (DPAHC):  This is a written record that states who you want to make healthcare choices for you when you are unable to make them for yourself. This person, called a proxy, is usually a family member or a friend. You may choose more than 1 proxy.  Do not resuscitate (DNR) order:  A DNR order is used in case your heart stops beating or you stop breathing. It is a request not to have certain forms of treatment, such as CPR. A DNR order may be included in other types of advance directives.  Medical directive:  This covers the care that you want if you are in a coma, near death, or unable to make decisions for yourself. You can list the treatments you want for each condition. Treatment may include pain medicine, surgery, blood transfusions, dialysis, IV or tube feedings, and a ventilator (breathing machine).  Values history:  This document has questions about your views, beliefs, and how you feel and think about life. This information can help others choose the care that you would choose.  Why are advance directives important?  An advance directive helps you control your care. Although spoken wishes may be used, it is better to have your wishes written down. Spoken wishes can be misunderstood, or not followed. Treatments may be given even if you do not want them. An advance directive may make it easier for your family  to make difficult choices about your care.   Weight Management   Why it is important to manage your weight:  Being overweight increases your risk of health conditions such as heart disease, high blood pressure, type 2 diabetes, and certain types of cancer. It can also increase your risk for osteoarthritis, sleep apnea, and other respiratory problems. Aim for a slow, steady weight loss. Even a small amount of weight loss can lower your risk of health problems.  How to lose weight safely:  A safe and healthy way to lose weight is to eat fewer calories and get regular exercise. You can lose up about 1 pound a week by decreasing the number of calories you eat by 500 calories each day.   Healthy meal plan for weight management:  A healthy meal plan includes a variety of foods, contains fewer calories, and helps you stay healthy. A healthy meal plan includes the following:  Eat whole-grain foods more often.  A healthy meal plan should contain fiber. Fiber is the part of grains, fruits, and vegetables that is not broken down by your body. Whole-grain foods are healthy and provide extra fiber in your diet. Some examples of whole-grain foods are whole-wheat breads and pastas, oatmeal, brown rice, and bulgur.  Eat a variety of vegetables every day.  Include dark, leafy greens such as spinach, kale, mariangel greens, and mustard greens. Eat yellow and orange vegetables such as carrots, sweet potatoes, and winter squash.   Eat a variety of fruits every day.  Choose fresh or canned fruit (canned in its own juice or light syrup) instead of juice. Fruit juice has very little or no fiber.  Eat low-fat dairy foods.  Drink fat-free (skim) milk or 1% milk. Eat fat-free yogurt and low-fat cottage cheese. Try low-fat cheeses such as mozzarella and other reduced-fat cheeses.  Choose meat and other protein foods that are low in fat.  Choose beans or other legumes such as split peas or lentils. Choose fish, skinless poultry (chicken or  turkey), or lean cuts of red meat (beef or pork). Before you cook meat or poultry, cut off any visible fat.   Use less fat and oil.  Try baking foods instead of frying them. Add less fat, such as margarine, sour cream, regular salad dressing and mayonnaise to foods. Eat fewer high-fat foods. Some examples of high-fat foods include french fries, doughnuts, ice cream, and cakes.  Eat fewer sweets.  Limit foods and drinks that are high in sugar. This includes candy, cookies, regular soda, and sweetened drinks.  Exercise:  Exercise at least 30 minutes per day on most days of the week. Some examples of exercise include walking, biking, dancing, and swimming. You can also fit in more physical activity by taking the stairs instead of the elevator or parking farther away from stores. Ask your healthcare provider about the best exercise plan for you.      © Copyright [employer] 2018 Information is for End User's use only and may not be sold, redistributed or otherwise used for commercial purposes. All illustrations and images included in CareNotes® are the copyrighted property of [employer] or [employer]

## 2024-02-20 NOTE — ASSESSMENT & PLAN NOTE
Had a positive Cologuard, colonoscopy only revealed diverticulosis.  She also had a few polyps which she will follow with GI regularly for.

## 2024-02-20 NOTE — ASSESSMENT & PLAN NOTE
Patient noticed symptoms mostly after drinking red wine.  She stopped drinking red wine and no longer has this issue.  Remains on omeprazole as well as famotidine at this point.  Follows with GI.

## 2024-02-20 NOTE — ASSESSMENT & PLAN NOTE
Remains on vitamin D over-the-counter as well as every 6 month Prolia injections.  Will recheck bone density after 5 years of therapy.

## 2024-02-21 VITALS
DIASTOLIC BLOOD PRESSURE: 78 MMHG | HEIGHT: 69 IN | OXYGEN SATURATION: 98 % | SYSTOLIC BLOOD PRESSURE: 128 MMHG | HEART RATE: 68 BPM | TEMPERATURE: 98.7 F | WEIGHT: 167 LBS | BODY MASS INDEX: 24.73 KG/M2

## 2024-02-21 PROBLEM — Z00.00 MEDICARE ANNUAL WELLNESS VISIT, SUBSEQUENT: Status: RESOLVED | Noted: 2024-02-20 | Resolved: 2024-02-21

## 2024-03-29 ENCOUNTER — CLINICAL SUPPORT (OUTPATIENT)
Dept: FAMILY MEDICINE CLINIC | Facility: CLINIC | Age: 69
End: 2024-03-29
Payer: MEDICARE

## 2024-03-29 DIAGNOSIS — M81.0 AGE-RELATED OSTEOPOROSIS WITHOUT CURRENT PATHOLOGICAL FRACTURE: Primary | ICD-10-CM

## 2024-03-29 PROCEDURE — 96372 THER/PROPH/DIAG INJ SC/IM: CPT | Performed by: INTERNAL MEDICINE

## 2024-05-28 ENCOUNTER — DOCUMENTATION (OUTPATIENT)
Dept: GASTROENTEROLOGY | Facility: CLINIC | Age: 69
End: 2024-05-28

## 2024-05-28 NOTE — PROGRESS NOTES
Records reviewed and outlined below in preparation for office visit 5/30/2024;    Laboratory data reviewed and outlined below    2/20/2024 laboratory data  Cholesterol 218  Triglycerides 97  HDL 78    Sodium 142  Potassium 4.1  BUN 13  Creatinine 0.76  AST 17  ALT 16  Alk phos 46  Albumin 4.4  T. bili 0.73  WBC 6.7 Hgb 14.8 HCT 48  platelet count 334,000    1/31/2024 CT scan abdomen pelvis with IV contrast  Small hiatal hernia  Liver/biliary tree unremarkable  No calcified gallstone  Splenic cyst measuring 1.6 cm.  Spleen is normal in size  Fatty atrophy noted in the pancreas most prominent in the pancreatic head  Bilateral adrenal nodules measuring 0.9 cm  Diverticulosis without diverticulitis.  Mild luminal narrowing within the distal gastric body.  This may be secondary to transient peristalsis.  This is of indeterminate significance and may correspond to the findings described on recent EGD.  No clear source of extrinsic compression    1/17/2024 EGD  -Normal duodenum to the second portion.  Biopsies negative for celiac disease.  -Mild antral erythema.  Biopsy negative for H. pylori  -Linear erythema with erosions greater curvature distal body of the stomach.  Biopsy revealed chronic inactive gastritis and edema.  Negative for H. pylori.  -Limited distensibility of the mid gastric body of uncertain significance may be a normal variant versus extrinsic compression versus large hiatal hernia which I felt was less likely  -Probable small 1 cm sliding hiatal hernia  -Several small polyps in the fundus.  Biopsies revealed fundic gland polyps  -LA grade B erosive esophagitis  -Torturous distal third esophagus  -Esophageal polyp measuring 5 to 6 mm at 30 cm.  Biopsy revealed squamous papilloma.  Cannot exclude HPV effect  Increased omeprazole to 40 mg daily  Continue famotidine 40 mg 2 hours before bed    8/3/2023  Fecal fat normal  Fecal elastase low at 131      8/7/2023 ultrasound abdomen  Liver normal  measuring 17.1 cm  Moderate diffuse increased echogenicity consistent with moderate hepatic steatosis  Gallbladder normal  Spleen normal with simple cyst measuring 1.4 x 1.2 x 1.2 cm        2/14/2023 laboratory data  Sodium 141  Potassium 4.0  Creatinine 0.63  AST 16  ALT 21  Alk phos 70  Albumin 3.9  Vitamin D 41.7  WC 6.06 Hgb 14.9 HCT 45.4 MCV 96 platelet count 328,000  Hemoglobin A1c 5.1  TSH 1.09           3/10/2022 colonoscopy  [place] bowel prep score 6  Pediatric scope  4 mm polyp distal rectum status post cold snare polypectomy.  Tubular adenoma  12 mm semipedunculated polyp hepatic flexure status post hot snare polypectomy.  Tubular adenoma  8 mm polyp mid ascending colon status post cold snare polypectomy.  Tubular adenoma  6 mm flat polyp distal ascending colon status post cold snare polypectomy.  Tubular adenoma  Moderate diverticulosis sigmoid colon  ?  Mild rectal prolapse

## 2024-05-28 NOTE — PROGRESS NOTES
[facility] Gastroenterology  Gastroenterology Outpatient Follow up  Patient Lily Silvacq   Age 68 y.o.   Gender female   MRN: [id number]  [facility] [id number]     ASSESSMENT AND PLAN:   Problem List Items Addressed This Visit          Digestive    Gastroesophageal reflux disease with esophagitis without hemorrhage - Primary     Lily has gastroesophageal reflux disease and was noted to have LA grade B erosive esophagitis.  Previously she did have heartburn symptoms on a fairly regular basis in addition she did experience nocturnal symptoms.  Her symptoms are significantly improved on omeprazole 40 mg in the morning and famotidine 40 mg 2 hours before bed.  Continue this dose of medication.  We will plan on repeating upper endoscopy to follow-up on erosive esophagitis and to follow-up on what appeared to be luminal narrowing in the mid gastric body.  CAT scan did not reveal any evidence of extrinsic compression.  Lifestyle modifications for gastroesophageal reflux disease were discussed and include limiting fried and fatty foods, mints, chocolates, carbonated and caffeinated beverages , and alcohol, etc.  Avoid lying down for 2-3 hours after meals.  If you have nighttime symptoms consider raising the head of the bed up on 4-6 inch blocks.  Pillows typically are not useful.  If you are overweight, weight loss will be helpful.    I explained to the patient the procedure of upper endoscopy as well as its potential risk which are approximately 1 in 1000 chance of bleeding, infection, and perforation.           Esophageal polyp     Lily was noted to have a polyp at 30 cm in the esophagus.  Pathology revealed a squamous papilloma.  The pathologist stated cannot exclude HPV effect.  This area will be reevaluated at time of repeat EGD.         Fatty liver     Ultrasound suggested fatty infiltration liver.  CAT scan done in January stated the liver appeared normal.  She really does not drink much a lot of alcohol.  Her  cholesterol was a bit high as was her LDL however her HDL was excellent at 78.  The exact cause for underlying fatty liver is not clear.  Will continue to monitor this.  At some point consider ultrasound elastography.  Check hepatitis A, B serologies.  If negative consider vaccine for hepatitis B.  Continue to limit alcohol use.  She never really drank to excess.         Relevant Orders    Hepatitis A antibody, total    Hepatitis B core antibody, total    Hepatitis B surface antibody    Hepatitis B surface antigen       Neurology/Sleep    Chest pressure     Lily is no longer experiencing chest pressure.  She did have cardiac evaluation and had a cardiac CT that did not reveal any obstructive coronary artery disease.  It is very likely that her chest pressure was really related to esophageal spasm and/or gastroesophageal reflux disease as her symptoms improved with treatment of reflux.            Other    Intermittent diarrhea     Lily had been experiencing some intermittent diarrhea, this is significantly improved since cutting out dairy products.  She will occasionally have bouts of abdominal cramping and rectal urgency and diarrhea.  This is less frequent.  She has not identified any particular triggers for this.  The abdominal cramping is relieved with a bowel movement.  This may be worse at times of stress or increased anxiety but she will try to pay close attention to this.  It is quite possible she has underlying irritable bowel syndrome.  Workup to date did reveal a low fecal elastase.  Fecal fat was normal.  Given improvement in symptoms with dietary modification we will hold off on the addition of pancreatic enzyme supplementation.  She will continue fiber supplementation on a daily basis as well.  She is currently using Metamucil Gummies         History of colon polyps     Patient was noted to have 4 mm polyp distal rectum status post cold snare polypectomy.  Tubular adenoma  12 mm semipedunculated polyp  hepatic flexure status post hot snare polypectomy.  Tubular adenoma  8 mm polyp mid ascending colon status post cold snare polypectomy.  Tubular adenoma  6 mm flat polyp distal ascending colon status post cold snare polypectomy.  Tubular adenoma  Based upon these findings I would recommend repeating a colonoscopy in March 2025.            Other Visit Diagnoses       Medication management        Relevant Orders    Vitamin B12    Folate    Magnesium    Exocrine pancreatic insufficiency        Relevant Orders    Vitamin B12    Folate    Encounter for screening for other viral diseases        Relevant Orders    Hepatitis B core antibody, total    Hepatitis B surface antibody    Hepatitis B surface antigen           _____________________________________________________________    HPI:   Lily is a delightful 68-year-old woman whom seen in the office in follow-up for gastroesophageal reflux disease with erosive esophagitis, intermittent diarrhea chest pressure and epigastric pain.  She reports that overall she has been doing much better.  She cut back on their use.  Her bowel pattern has improved with doing this.  For the most part she will have about 3 bowel movements a day which are formed.  There is been no rectal bleeding or black stools.  She may have an occasional urgent loose bowel movement.  This is preceded by abdominal cramping relieved with bowel movement.  There is been no nocturnal stooling.    From an upper GI standpoint she is no longer experiencing chest pressure.  She is not experiencing much a lot of heartburn.  She has been using omeprazole 40 mg in the morning and famotidine 40 mg in the evening.  She used to have epigastric pain and chest pressure but she has not had this since starting these medications.  She has seen cardiology and they did not feel her chest pressure was cardiac related.  She did have a cardiac CT obtained as well results are noted below.  There is been no dysphagia nausea vomiting  or early satiety.  There is been no unintentional weight loss    In reviewing her symptoms with her, prior to going on omeprazole she would have heartburn several times a week.  She was experiencing nocturnal symptoms as well.  Since using omeprazole and famotidine the symptoms have significantly improved.      Family history:  Father may have had colon polyps.  Dad had peptic ulcer disease  Mom had breast cancer  Paternal uncle had colon cancer at age 68.    Records reviewed for 15 minutes prior to office visit  Laboratory data reviewed and outlined below    2/20/2024 laboratory data  Cholesterol 218  Triglycerides 97  HDL 78    Sodium 142  Potassium 4.1  BUN 13  Creatinine 0.76  AST 17  ALT 16  Alk phos 46  Albumin 4.4  T. bili 0.73  WBC 6.7 Hgb 14.8 HCT 48  platelet count 334,000    1/31/2024 CT scan abdomen pelvis with IV contrast  Small hiatal hernia  Liver/biliary tree unremarkable  No calcified gallstone  Splenic cyst measuring 1.6 cm.  Spleen is normal in size  Fatty atrophy noted in the pancreas most prominent in the pancreatic head  Bilateral adrenal nodules measuring 0.9 cm  Diverticulosis without diverticulitis.  Mild luminal narrowing within the distal gastric body.  This may be secondary to transient peristalsis.  This is of indeterminate significance and may correspond to the findings described on recent EGD.  No clear source of extrinsic compression    1/17/2024 EGD  -Normal duodenum to the second portion.  Biopsies negative for celiac disease.  -Mild antral erythema.  Biopsy negative for H. pylori  -Linear erythema with erosions greater curvature distal body of the stomach.  Biopsy revealed chronic inactive gastritis and edema.  Negative for H. pylori.  -Limited distensibility of the mid gastric body of uncertain significance may be a normal variant versus extrinsic compression versus large hiatal hernia which I felt was less likely  -Probable small 1 cm sliding hiatal hernia  -Several  small polyps in the fundus.  Biopsies revealed fundic gland polyps  -LA grade B erosive esophagitis  -Torturous distal third esophagus  -Esophageal polyp measuring 5 to 6 mm at 30 cm.  Biopsy revealed squamous papilloma.  Cannot exclude HPV effect  Increased omeprazole to 40 mg daily  Continue famotidine 40 mg 2 hours before bed    9/5/2023 cardiac CT  According to cardiology, the coronary CTA showed no obstructive coronary artery disease.    8/3/2023  Fecal fat normal  Fecal elastase low at 131      8/7/2023 ultrasound abdomen  Liver normal measuring 17.1 cm  Moderate diffuse increased echogenicity consistent with moderate hepatic steatosis  Gallbladder normal  Spleen normal with simple cyst measuring 1.4 x 1.2 x 1.2 cm        2/14/2023 laboratory data  Sodium 141  Potassium 4.0  Creatinine 0.63  AST 16  ALT 21  Alk phos 70  Albumin 3.9  Vitamin D 41.7  WC 6.06 Hgb 14.9 HCT 45.4 MCV 96 platelet count 328,000  Hemoglobin A1c 5.1  TSH 1.09           3/10/2022 colonoscopy  [place] bowel prep score 6  Pediatric scope  4 mm polyp distal rectum status post cold snare polypectomy.  Tubular adenoma  12 mm semipedunculated polyp hepatic flexure status post hot snare polypectomy.  Tubular adenoma  8 mm polyp mid ascending colon status post cold snare polypectomy.  Tubular adenoma  6 mm flat polyp distal ascending colon status post cold snare polypectomy.  Tubular adenoma  Moderate diverticulosis sigmoid colon  ?  Mild rectal prolapse       Allergies   Allergen Reactions    Tetracycline Rash     Current Outpatient Medications   Medication Sig Dispense Refill    famotidine (PEPCID) 40 MG tablet Take 1 tablet (40 mg total) by mouth daily at bedtime Take in evening 2 hours prior to bed 90 tablet 3    omeprazole (PriLOSEC) 40 MG capsule Take 1 capsule (40 mg total) by mouth daily Take 30 minutes to 1 hour before 1 meal a day 90 capsule 4     No current facility-administered medications for this visit.     MEDICAL HISTORY:  Past  "Medical History:   Diagnosis Date    BRCA1 negative     BRCA2 negative     Hepatitis C     Positive colorectal cancer screening using Cologuard test      Past Surgical History:   Procedure Laterality Date    BREAST BIOPSY Bilateral benign 20yrs ago    COLONOSCOPY       Social History     Substance and Sexual Activity   Alcohol Use Yes    Comment: social     Social History     Substance and Sexual Activity   Drug Use Never     Social History     Tobacco Use   Smoking Status Former   Smokeless Tobacco Never     Family History   Problem Relation Age of Onset    Breast cancer Mother 75    No Known Problems Maternal Grandmother     No Known Problems Paternal Grandmother     No Known Problems Maternal Aunt          Objective   Blood pressure 118/88, pulse 79, resp. rate 18, height 5' 9\" (1.753 m), weight 73.5 kg (162 lb), SpO2 98%. Body mass index is 23.92 kg/m².    PHYSICAL EXAM:   General Appearance: Alert, cooperative, no distress  HEENT: Normocephalic, atraumatic, anicteric.   Neck: Supple, symmetrical, trachea midline  Lungs: Clear to auscultation bilaterally; no rales, rhonchi or wheezing; respirations unlabored   Heart: Regular rate and rhythm; no murmur, rub, or gallop.  Abdomen: Soft, bowel sounds normal, non-tender, non-distended; no masses, there is no hepatosplenomegaly. No spider angiomas  Genitalia: Deferred   Rectal: Deferred   Extremities: No cyanosis, clubbing or edema   Skin: No jaundice, rashes, or lesions   Lymph nodes: No palpable cervical lymphadenopathy   Lab Results:   No visits with results within 2 Month(s) from this visit.   Latest known visit with results is:   Appointment on 02/20/2024   Component Date Value    WBC 02/20/2024 6.70     RBC 02/20/2024 4.79     Hemoglobin 02/20/2024 14.8     Hematocrit 02/20/2024 48.0 (H)     MCV 02/20/2024 100 (H)     MCH 02/20/2024 30.9     MCHC 02/20/2024 30.8 (L)     RDW 02/20/2024 12.2     MPV 02/20/2024 10.7     Platelets 02/20/2024 334     nRBC 02/20/2024 " 0     Segmented % 02/20/2024 59     Immature Grans % 02/20/2024 0     Lymphocytes % 02/20/2024 30     Monocytes % 02/20/2024 7     Eosinophils Relative 02/20/2024 3     Basophils Relative 02/20/2024 1     Absolute Neutrophils 02/20/2024 3.97     Absolute Immature Grans 02/20/2024 0.02     Absolute Lymphocytes 02/20/2024 1.98     Absolute Monocytes 02/20/2024 0.45     Eosinophils Absolute 02/20/2024 0.21     Basophils Absolute 02/20/2024 0.07     Sodium 02/20/2024 142     Potassium 02/20/2024 4.1     Chloride 02/20/2024 106     CO2 02/20/2024 29     ANION GAP 02/20/2024 7     BUN 02/20/2024 13     Creatinine 02/20/2024 0.76     Glucose, Fasting 02/20/2024 82     Calcium 02/20/2024 9.7     AST 02/20/2024 17     ALT 02/20/2024 16     Alkaline Phosphatase 02/20/2024 46     Total Protein 02/20/2024 7.2     Albumin 02/20/2024 4.4     Total Bilirubin 02/20/2024 0.73     eGFR 02/20/2024 81     Hemoglobin A1C 02/20/2024 5.3     EAG 02/20/2024 105     Cholesterol 02/20/2024 218 (H)     Triglycerides 02/20/2024 97     HDL, Direct 02/20/2024 78     LDL Calculated 02/20/2024 121 (H)     TSH 3RD GENERATON 02/20/2024 1.998      Radiology Results:   No results found.  Chato Catherine DO   05/30/24   Cc:

## 2024-05-30 ENCOUNTER — PREP FOR PROCEDURE (OUTPATIENT)
Dept: GASTROENTEROLOGY | Facility: CLINIC | Age: 69
End: 2024-05-30

## 2024-05-30 ENCOUNTER — OFFICE VISIT (OUTPATIENT)
Dept: GASTROENTEROLOGY | Facility: CLINIC | Age: 69
End: 2024-05-30
Payer: MEDICARE

## 2024-05-30 VITALS
RESPIRATION RATE: 18 BRPM | OXYGEN SATURATION: 98 % | WEIGHT: 162 LBS | SYSTOLIC BLOOD PRESSURE: 118 MMHG | HEART RATE: 79 BPM | HEIGHT: 69 IN | BODY MASS INDEX: 23.99 KG/M2 | DIASTOLIC BLOOD PRESSURE: 88 MMHG

## 2024-05-30 DIAGNOSIS — R07.89 CHEST PRESSURE: ICD-10-CM

## 2024-05-30 DIAGNOSIS — K76.0 FATTY LIVER: ICD-10-CM

## 2024-05-30 DIAGNOSIS — R19.7 INTERMITTENT DIARRHEA: ICD-10-CM

## 2024-05-30 DIAGNOSIS — K21.00 GASTROESOPHAGEAL REFLUX DISEASE WITH ESOPHAGITIS WITHOUT HEMORRHAGE: Primary | ICD-10-CM

## 2024-05-30 DIAGNOSIS — Z79.899 MEDICATION MANAGEMENT: ICD-10-CM

## 2024-05-30 DIAGNOSIS — K86.81 EXOCRINE PANCREATIC INSUFFICIENCY: ICD-10-CM

## 2024-05-30 DIAGNOSIS — Z86.010 HISTORY OF COLON POLYPS: ICD-10-CM

## 2024-05-30 DIAGNOSIS — K22.81 ESOPHAGEAL POLYP: ICD-10-CM

## 2024-05-30 DIAGNOSIS — Z11.59 ENCOUNTER FOR SCREENING FOR OTHER VIRAL DISEASES: ICD-10-CM

## 2024-05-30 PROCEDURE — 99214 OFFICE O/P EST MOD 30 MIN: CPT | Performed by: INTERNAL MEDICINE

## 2024-05-30 NOTE — PATIENT INSTRUCTIONS
Gastroesophageal reflux disease with esophagitis without hemorrhage  Lily has gastroesophageal reflux disease and was noted to have LA grade B erosive esophagitis.  Previously she did have heartburn symptoms on a fairly regular basis in addition she did experience nocturnal symptoms.  Her symptoms are significantly improved on omeprazole 40 mg in the morning and famotidine 40 mg 2 hours before bed.  Continue this dose of medication.  We will plan on repeating upper endoscopy to follow-up on erosive esophagitis and to follow-up on what appeared to be luminal narrowing in the mid gastric body.  CAT scan did not reveal any evidence of extrinsic compression.  Lifestyle modifications for gastroesophageal reflux disease were discussed and include limiting fried and fatty foods, mints, chocolates, carbonated and caffeinated beverages , and alcohol, etc.  Avoid lying down for 2-3 hours after meals.  If you have nighttime symptoms consider raising the head of the bed up on 4-6 inch blocks.  Pillows typically are not useful.  If you are overweight, weight loss will be helpful.    I explained to the patient the procedure of upper endoscopy as well as its potential risk which are approximately 1 in 1000 chance of bleeding, infection, and perforation.      History of colon polyps  Patient was noted to have 4 mm polyp distal rectum status post cold snare polypectomy.  Tubular adenoma  12 mm semipedunculated polyp hepatic flexure status post hot snare polypectomy.  Tubular adenoma  8 mm polyp mid ascending colon status post cold snare polypectomy.  Tubular adenoma  6 mm flat polyp distal ascending colon status post cold snare polypectomy.  Tubular adenoma  Based upon these findings I would recommend repeating a colonoscopy in March 2025.      Intermittent diarrhea  Lily had been experiencing some intermittent diarrhea, this is significantly improved since cutting out dairy products.  She will occasionally have bouts of abdominal  cramping and rectal urgency and diarrhea.  This is less frequent.  She has not identified any particular triggers for this.  The abdominal cramping is relieved with a bowel movement.  This may be worse at times of stress or increased anxiety but she will try to pay close attention to this.  It is quite possible she has underlying irritable bowel syndrome.  Workup to date did reveal a low fecal elastase.  Fecal fat was normal.  Given improvement in symptoms with dietary modification we will hold off on the addition of pancreatic enzyme supplementation.  She will continue fiber supplementation on a daily basis as well.  She is currently using Metamucil Gummies    Chest pressure  Lliy is no longer experiencing chest pressure.  She did have cardiac evaluation and had a cardiac CT that did not reveal any obstructive coronary artery disease.  It is very likely that her chest pressure was really related to esophageal spasm and/or gastroesophageal reflux disease as her symptoms improved with treatment of reflux.    Esophageal polyp  Lily was noted to have a polyp at 30 cm in the esophagus.  Pathology revealed a squamous papilloma.  The pathologist stated cannot exclude HPV effect.  This area will be reevaluated at time of repeat EGD.    Fatty liver  Ultrasound suggested fatty infiltration liver.  CAT scan done in January stated the liver appeared normal.  She really does not drink much a lot of alcohol.  Her cholesterol was a bit high as was her LDL however her HDL was excellent at 78.  The exact cause for underlying fatty liver is not clear.  Will continue to monitor this.  At some point consider ultrasound elastography.  Check hepatitis A, B serologies.  If negative consider vaccine for hepatitis B.  Continue to limit alcohol use.  She never really drank to excess.

## 2024-05-30 NOTE — ASSESSMENT & PLAN NOTE
Ultrasound suggested fatty infiltration liver.  CAT scan done in January stated the liver appeared normal.  She really does not drink much a lot of alcohol.  Her cholesterol was a bit high as was her LDL however her HDL was excellent at 78.  The exact cause for underlying fatty liver is not clear.  Will continue to monitor this.  At some point consider ultrasound elastography.  Check hepatitis A, B serologies.  If negative consider vaccine for hepatitis B.  Continue to limit alcohol use.  She never really drank to excess.

## 2024-05-30 NOTE — ASSESSMENT & PLAN NOTE
Lily has gastroesophageal reflux disease and was noted to have LA grade B erosive esophagitis.  Previously she did have heartburn symptoms on a fairly regular basis in addition she did experience nocturnal symptoms.  Her symptoms are significantly improved on omeprazole 40 mg in the morning and famotidine 40 mg 2 hours before bed.  Continue this dose of medication.  We will plan on repeating upper endoscopy to follow-up on erosive esophagitis and to follow-up on what appeared to be luminal narrowing in the mid gastric body.  CAT scan did not reveal any evidence of extrinsic compression.  Lifestyle modifications for gastroesophageal reflux disease were discussed and include limiting fried and fatty foods, mints, chocolates, carbonated and caffeinated beverages , and alcohol, etc.  Avoid lying down for 2-3 hours after meals.  If you have nighttime symptoms consider raising the head of the bed up on 4-6 inch blocks.  Pillows typically are not useful.  If you are overweight, weight loss will be helpful.    I explained to the patient the procedure of upper endoscopy as well as its potential risk which are approximately 1 in 1000 chance of bleeding, infection, and perforation.

## 2024-05-30 NOTE — ASSESSMENT & PLAN NOTE
Lily is no longer experiencing chest pressure.  She did have cardiac evaluation and had a cardiac CT that did not reveal any obstructive coronary artery disease.  It is very likely that her chest pressure was really related to esophageal spasm and/or gastroesophageal reflux disease as her symptoms improved with treatment of reflux.

## 2024-05-30 NOTE — ASSESSMENT & PLAN NOTE
Lily was noted to have a polyp at 30 cm in the esophagus.  Pathology revealed a squamous papilloma.  The pathologist stated cannot exclude HPV effect.  This area will be reevaluated at time of repeat EGD.

## 2024-05-30 NOTE — ASSESSMENT & PLAN NOTE
Lily had been experiencing some intermittent diarrhea, this is significantly improved since cutting out dairy products.  She will occasionally have bouts of abdominal cramping and rectal urgency and diarrhea.  This is less frequent.  She has not identified any particular triggers for this.  The abdominal cramping is relieved with a bowel movement.  This may be worse at times of stress or increased anxiety but she will try to pay close attention to this.  It is quite possible she has underlying irritable bowel syndrome.  Workup to date did reveal a low fecal elastase.  Fecal fat was normal.  Given improvement in symptoms with dietary modification we will hold off on the addition of pancreatic enzyme supplementation.  She will continue fiber supplementation on a daily basis as well.  She is currently using Metamucil Gummies

## 2024-07-09 ENCOUNTER — ANESTHESIA EVENT (OUTPATIENT)
Dept: GASTROENTEROLOGY | Facility: HOSPITAL | Age: 69
End: 2024-07-09

## 2024-07-09 ENCOUNTER — HOSPITAL ENCOUNTER (OUTPATIENT)
Dept: GASTROENTEROLOGY | Facility: HOSPITAL | Age: 69
Setting detail: OUTPATIENT SURGERY
Discharge: HOME | End: 2024-07-09
Attending: INTERNAL MEDICINE
Payer: MEDICARE

## 2024-07-09 ENCOUNTER — ANESTHESIA (OUTPATIENT)
Dept: GASTROENTEROLOGY | Facility: HOSPITAL | Age: 69
End: 2024-07-09

## 2024-07-09 VITALS
OXYGEN SATURATION: 94 % | RESPIRATION RATE: 18 BRPM | TEMPERATURE: 96.8 F | SYSTOLIC BLOOD PRESSURE: 99 MMHG | DIASTOLIC BLOOD PRESSURE: 60 MMHG | HEART RATE: 87 BPM

## 2024-07-09 DIAGNOSIS — R10.13 EPIGASTRIC DISCOMFORT: ICD-10-CM

## 2024-07-09 PROCEDURE — 43239 EGD BIOPSY SINGLE/MULTIPLE: CPT | Performed by: INTERNAL MEDICINE

## 2024-07-09 PROCEDURE — 88305 TISSUE EXAM BY PATHOLOGIST: CPT | Performed by: PATHOLOGY

## 2024-07-09 PROCEDURE — 88313 SPECIAL STAINS GROUP 2: CPT | Performed by: PATHOLOGY

## 2024-07-09 RX ORDER — SODIUM CHLORIDE, SODIUM LACTATE, POTASSIUM CHLORIDE, CALCIUM CHLORIDE 600; 310; 30; 20 MG/100ML; MG/100ML; MG/100ML; MG/100ML
125 INJECTION, SOLUTION INTRAVENOUS CONTINUOUS
Status: DISCONTINUED | OUTPATIENT
Start: 2024-07-09 | End: 2024-07-09

## 2024-07-09 RX ORDER — PROPOFOL 10 MG/ML
INJECTION, EMULSION INTRAVENOUS AS NEEDED
Status: DISCONTINUED | OUTPATIENT
Start: 2024-07-09 | End: 2024-07-09

## 2024-07-09 RX ORDER — LIDOCAINE HYDROCHLORIDE 20 MG/ML
INJECTION, SOLUTION EPIDURAL; INFILTRATION; INTRACAUDAL; PERINEURAL AS NEEDED
Status: DISCONTINUED | OUTPATIENT
Start: 2024-07-09 | End: 2024-07-09

## 2024-07-09 RX ORDER — GLYCOPYRROLATE 0.2 MG/ML
INJECTION INTRAMUSCULAR; INTRAVENOUS AS NEEDED
Status: DISCONTINUED | OUTPATIENT
Start: 2024-07-09 | End: 2024-07-09

## 2024-07-09 RX ADMIN — LIDOCAINE HYDROCHLORIDE 25 MG: 20 INJECTION, SOLUTION EPIDURAL; INFILTRATION; INTRACAUDAL; PERINEURAL at 11:16

## 2024-07-09 RX ADMIN — GLYCOPYRROLATE 0.2 MG: 0.2 INJECTION INTRAMUSCULAR; INTRAVENOUS at 11:12

## 2024-07-09 RX ADMIN — SODIUM CHLORIDE, SODIUM LACTATE, POTASSIUM CHLORIDE, AND CALCIUM CHLORIDE 125 ML/HR: .6; .31; .03; .02 INJECTION, SOLUTION INTRAVENOUS at 10:37

## 2024-07-09 RX ADMIN — PROPOFOL 30 MG: 10 INJECTION, EMULSION INTRAVENOUS at 11:20

## 2024-07-09 RX ADMIN — PROPOFOL 50 MG: 10 INJECTION, EMULSION INTRAVENOUS at 11:22

## 2024-07-09 RX ADMIN — PROPOFOL 100 MG: 10 INJECTION, EMULSION INTRAVENOUS at 11:16

## 2024-07-09 RX ADMIN — PROPOFOL 50 MG: 10 INJECTION, EMULSION INTRAVENOUS at 11:18

## 2024-07-09 NOTE — ANESTHESIA PREPROCEDURE EVALUATION
Procedure:  EGD    Relevant Problems   GI/HEPATIC   (+) Fatty liver   (+) Gastroesophageal reflux disease with esophagitis without hemorrhage   (+) Hiatal hernia with GERD without esophagitis      MUSCULOSKELETAL   (+) Hiatal hernia with GERD without esophagitis      Digestive   (+) History of hepatitis C      Neurology/Sleep   (+) Chest pressure   (+) Epigastric discomfort      Blood   (+) Elevated MCV      Orthopedic/Musculoskeletal   (+) Age-related osteoporosis without current pathological fracture      FEN/Gastrointestinal   (+) Diverticulosis   (+) Esophageal polyp      Other   (+) History of colon polyps   (+) Intermittent diarrhea      Lab Results   Component Value Date    SODIUM 142 02/20/2024    K 4.1 02/20/2024     02/20/2024    CO2 29 02/20/2024    AGAP 7 02/20/2024    BUN 13 02/20/2024    CREATININE 0.76 02/20/2024    GLUC 93 02/14/2023    GLUF 82 02/20/2024    CALCIUM 9.7 02/20/2024    AST 17 02/20/2024    ALT 16 02/20/2024    ALKPHOS 46 02/20/2024    TP 7.2 02/20/2024    TBILI 0.73 02/20/2024    EGFR 81 02/20/2024     Lab Results   Component Value Date    WBC 6.70 02/20/2024    HGB 14.8 02/20/2024    HCT 48.0 (H) 02/20/2024     (H) 02/20/2024     02/20/2024       Physical Exam    Airway    Mallampati score: II  TM Distance: >3 FB  Neck ROM: full     Dental       Cardiovascular      Pulmonary      Other Findings  post-pubertal.      Anesthesia Plan  ASA Score- 2     Anesthesia Type- IV sedation with anesthesia with ASA Monitors.         Additional Monitors:     Airway Plan:            Plan Factors-Exercise tolerance (METS): >4 METS.    Chart reviewed. EKG reviewed. Imaging results reviewed. Existing labs reviewed. Patient summary reviewed.                  Induction- intravenous.    Postoperative Plan-         Informed Consent- Anesthetic plan and risks discussed with patient.  I personally reviewed this patient with the CRNA. Discussed and agreed on the Anesthesia Plan with the  CRNA..

## 2024-07-09 NOTE — H&P
History and Physical - [facility]  Lily Zhang 68 y.o. female MRN: [id number]                  HPI: Lily Zhang is a 68 y.o. year old female who presents for EGD.  Please refer to my office note dated May 30, 2024 for details of her medical history.  EGD is being performed to follow-up on LA grade B erosive esophagitis.  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  The pathologist stated he cannot exclude HPV effect.  The esophagitis and squamous papilloma area will be evaluated at time of this procedure.    Lily reports significant improvement in her chest pressure.  She is only experiencing any heartburn or indigestion.  Denies any dysphagia nausea or vomiting.  However her bowel habits are improved.  She may have some intermittent diarrhea.  This been no rectal bleeding or black stools.  There is no family history of gastric cancer or esophageal cancer that she is aware of.    She is currently on omeprazole 40 mg in the morning and famotidine 40 mg 2 hours before bed.      REVIEW OF SYSTEMS: Per the HPI, and otherwise unremarkable.    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     Diverticulosis     GERD (gastroesophageal reflux disease)     Hepatitis C     Positive colorectal cancer screening using Cologuard test      Past Surgical History:   Procedure Laterality Date    BREAST BIOPSY Bilateral benign 20yrs ago    COLONOSCOPY       Social History   Social History     Substance and Sexual Activity   Alcohol Use Yes    Comment: social     Social History     Substance and Sexual Activity   Drug Use Never     Social History     Tobacco Use   Smoking Status Former   Smokeless Tobacco Never     Family History   Problem Relation Age of Onset    Breast cancer Mother 75    No Known Problems Maternal Grandmother     No Known Problems Paternal Grandmother     No Known Problems Maternal Aunt         Meds/Allergies       Current Outpatient Medications:     famotidine (PEPCID) 40 MG tablet    omeprazole (PriLOSEC) 40 MG capsule    Current Facility-Administered Medications:     lactated ringers infusion, 125 mL/hr, Intravenous, Continuous, 125 mL/hr at 07/09/24 1037    Allergies   Allergen Reactions    Tetracycline Rash       Objective     /58   Pulse 77   Temp (!) 96.8 °F (36 °C) (Temporal)   Resp 18   SpO2 96%       PHYSICAL EXAM    Gen: NAD  Head: NCAT  CV: RRR  CHEST: Clear  ABD: soft, NT/ND  EXT: no edema      ASSESSMENT/PLAN:  This is a 68 y.o. year old female here for EGD  , and she is stable and optimized for her procedure.

## 2024-07-09 NOTE — ANESTHESIA POSTPROCEDURE EVALUATION
Post-Op Assessment Note    CV Status:  Stable  Pain Score: 0    Pain management: adequate       Mental Status:  Arousable   Hydration Status:  Stable   PONV Controlled:  None   Airway Patency:  Patent     Post Op Vitals Reviewed: Yes    No anethesia notable event occurred.    Staff: CRNA               BP   99/63   Temp      Pulse  84   Resp   14   SpO2   97%

## 2024-07-10 PROCEDURE — 88305 TISSUE EXAM BY PATHOLOGIST: CPT | Performed by: PATHOLOGY

## 2024-07-10 PROCEDURE — 88313 SPECIAL STAINS GROUP 2: CPT | Performed by: PATHOLOGY

## 2024-07-10 NOTE — RESULT ENCOUNTER NOTE
I sent patient a [employer] message stating that;    Biopsies from the stomach are benign and negative for a bacteria called H. pylori.  The biopsies did note intestinal metaplasia.  Intestinal metaplasia is when we see cells similar to the small intestine developing in the stomach.  This is a benign disorder but does require some follow-up.  I would recommend repeating upper endoscopy in 2 years.  Please feel free to reach out if you have any questions or other concerns.  Otherwise I will see you in December.

## 2024-09-10 ENCOUNTER — TELEPHONE (OUTPATIENT)
Age: 69
End: 2024-09-10

## 2024-09-10 DIAGNOSIS — U07.1 COVID-19: Primary | ICD-10-CM

## 2024-09-10 RX ORDER — NIRMATRELVIR AND RITONAVIR 300-100 MG
3 KIT ORAL 2 TIMES DAILY
Qty: 30 TABLET | Refills: 0 | Status: SHIPPED | OUTPATIENT
Start: 2024-09-10 | End: 2024-09-15

## 2024-09-10 NOTE — TELEPHONE ENCOUNTER
Notify her I sent the Paxlovid in for her if she would like to take this, not contraindicated.  She likely will get through this without issue however.

## 2024-09-10 NOTE — TELEPHONE ENCOUNTER
CBC check for COVID.  If she did not she should.  She is on her last day of being able to use Paxlovid, I do not think she will need this and she has another 48 hours of symptoms likely.  If she test negative she should call, and we will see about getting her some medicine for bronchitis/sinusitis.

## 2024-09-10 NOTE — TELEPHONE ENCOUNTER
Covid symptoms started Saturday, achy, sore throat, cough, flush. No energy today and dry cough. Otherwise patient states she is doing ok. Please advise.

## 2024-09-10 NOTE — TELEPHONE ENCOUNTER
Spoke to patient she did test positive for Covid. Her symptoms started on Saturday. Patient uses [facility] if you can send medications in for patient.

## 2024-09-17 ENCOUNTER — TELEPHONE (OUTPATIENT)
Age: 69
End: 2024-09-17

## 2024-09-17 DIAGNOSIS — J20.9 ACUTE BRONCHITIS WITH BRONCHOSPASM: Primary | ICD-10-CM

## 2024-09-17 RX ORDER — CEFUROXIME AXETIL 250 MG/1
250 TABLET ORAL EVERY 12 HOURS SCHEDULED
Qty: 14 TABLET | Refills: 0 | Status: SHIPPED | OUTPATIENT
Start: 2024-09-17 | End: 2024-09-24

## 2024-09-17 RX ORDER — PREDNISONE 10 MG/1
TABLET ORAL
Qty: 20 TABLET | Refills: 0 | Status: SHIPPED | OUTPATIENT
Start: 2024-09-17

## 2024-09-17 RX ORDER — DEXTROMETHORPHAN HYDROBROMIDE AND PROMETHAZINE HYDROCHLORIDE 15; 6.25 MG/5ML; MG/5ML
5 SYRUP ORAL 4 TIMES DAILY PRN
Qty: 118 ML | Refills: 0 | Status: SHIPPED | OUTPATIENT
Start: 2024-09-17

## 2024-09-17 NOTE — TELEPHONE ENCOUNTER
Lily finished her paxlovid yesterday.  She still has a cough.  She said the cough is not productive but it is still there and last time she was sick it ended up as bronchitis. She still has a lot of lethargy.  It is hard to get motivated then with the hard cough on top of it.    Is there anything that can be called in for her to take not since the paxlovid is ended.    [facility] [place] if medically appropriate    Please follow up with patient if appointment is needed    Thank you

## 2024-09-17 NOTE — TELEPHONE ENCOUNTER
Notify patient that I sent in an antibiotic, prednisone taper and cough syrup which she can use at night before bed.  This was sent to [facility] on [address].

## 2024-09-30 ENCOUNTER — TELEPHONE (OUTPATIENT)
Dept: FAMILY MEDICINE CLINIC | Facility: CLINIC | Age: 69
End: 2024-09-30

## 2024-09-30 DIAGNOSIS — M81.0 AGE-RELATED OSTEOPOROSIS WITHOUT CURRENT PATHOLOGICAL FRACTURE: Primary | ICD-10-CM

## 2024-10-01 ENCOUNTER — CLINICAL SUPPORT (OUTPATIENT)
Dept: FAMILY MEDICINE CLINIC | Facility: CLINIC | Age: 69
End: 2024-10-01
Payer: MEDICARE

## 2024-10-01 DIAGNOSIS — M81.0 AGE-RELATED OSTEOPOROSIS WITHOUT CURRENT PATHOLOGICAL FRACTURE: Primary | ICD-10-CM

## 2024-10-01 DIAGNOSIS — Z23 ENCOUNTER FOR IMMUNIZATION: ICD-10-CM

## 2024-10-01 PROCEDURE — G0008 ADMIN INFLUENZA VIRUS VAC: HCPCS | Performed by: INTERNAL MEDICINE

## 2024-10-01 PROCEDURE — 96372 THER/PROPH/DIAG INJ SC/IM: CPT | Performed by: INTERNAL MEDICINE

## 2024-10-01 PROCEDURE — 90662 IIV NO PRSV INCREASED AG IM: CPT | Performed by: INTERNAL MEDICINE

## 2024-10-28 DIAGNOSIS — R07.89 CHEST PRESSURE: ICD-10-CM

## 2024-10-28 RX ORDER — FAMOTIDINE 40 MG/1
TABLET, FILM COATED ORAL
Qty: 90 TABLET | Refills: 1 | Status: SHIPPED | OUTPATIENT
Start: 2024-10-28

## 2024-11-20 ENCOUNTER — OFFICE VISIT (OUTPATIENT)
Dept: CARDIOLOGY CLINIC | Facility: CLINIC | Age: 69
End: 2024-11-20
Payer: MEDICARE

## 2024-11-20 VITALS
OXYGEN SATURATION: 96 % | HEART RATE: 92 BPM | WEIGHT: 166 LBS | SYSTOLIC BLOOD PRESSURE: 126 MMHG | TEMPERATURE: 97.8 F | BODY MASS INDEX: 24.59 KG/M2 | HEIGHT: 69 IN | DIASTOLIC BLOOD PRESSURE: 82 MMHG

## 2024-11-20 DIAGNOSIS — R07.89 CHEST PRESSURE: Primary | ICD-10-CM

## 2024-11-20 DIAGNOSIS — E78.49 OTHER HYPERLIPIDEMIA: ICD-10-CM

## 2024-11-20 DIAGNOSIS — R03.0 ELEVATED BLOOD PRESSURE READING WITHOUT DIAGNOSIS OF HYPERTENSION: ICD-10-CM

## 2024-11-20 PROCEDURE — 99213 OFFICE O/P EST LOW 20 MIN: CPT | Performed by: INTERNAL MEDICINE

## 2024-11-20 PROCEDURE — 93000 ELECTROCARDIOGRAM COMPLETE: CPT | Performed by: INTERNAL MEDICINE

## 2024-11-20 NOTE — ASSESSMENT & PLAN NOTE
-Patient notes blood pressure is well-controlled we will continue to monitor and let our office know significantly elevated greater than 130/80's MHG for initiation of medical therapy  -Counseled on dietary and lifestyle modifications including low-salt, DASH diet and NSAID avoidance  -Continue to monitor

## 2024-11-20 NOTE — ASSESSMENT & PLAN NOTE
-Counseled patient on dietary and lifestyle modifications  -Coronary calcium score 19 and ASCVD risk-to 4.4% therefore as patient wishes to hold off initiation medical therapy could trial  -Will repeat fasting lipid panel and CMP prior to next office visit  -Continue to monitor   present

## 2024-11-20 NOTE — PROGRESS NOTES
Patient ID: Lily Zhang is a 69 y.o. female.        Plan:      Assessment & Plan  Chest pressure  -Improved at this time with no significant exertional component  -Continue to monitor  Other hyperlipidemia  -Counseled patient on dietary and lifestyle modifications  -Coronary calcium score 19 and ASCVD risk-to 4.4% therefore as patient wishes to hold off initiation medical therapy could trial  -Will repeat fasting lipid panel and CMP prior to next office visit  -Continue to monitor  Elevated blood pressure reading without diagnosis of hypertension  -Patient notes blood pressure is well-controlled we will continue to monitor and let our office know significantly elevated greater than 130/80's MHG for initiation of medical therapy  -Counseled on dietary and lifestyle modifications including low-salt, DASH diet and NSAID avoidance  -Continue to monitor      Follow up Plan/Other summary comments:  -Lipid panel 2/20/2024 showing total cholesterol 218, triglyceride 97, HDL 78,   -Counseled patient on dietary and lifestyle modifications including following a low-salt, low-fat, heart healthy diet with sodium restriction to less than 1800 mg of sodium daily, DASH diet and NSAID avoidance  -ECG performed in the office today shows sinus rhythm heart rate 82 bpm  -Patient will monitor home blood pressure readings let our office know significantly elevated greater than 130/80's MHG for up titration of medical therapy  -Patient wished to trial dietary and lifestyle modifications prior to initiation of medical therapy therefore we will repeat fasting lipid panel and CMP in 6 months prior to next office visit  -Will see patient in 6 months or sooner if necessary.  -Patient counseled if she were to have any warning or alarm type symptoms she is to seek emergency medical care immediately.    HPI:   -Patient is a 69-year-old female with GERD, history of colon polyps and esophageal issues follows with gastroenterology and  originally presented to the office in August 2023 after referral from GI team for chest pain.  She had noted that it had been present over the prior couple months and has been improved with dietary and lifestyle changes.  She presents to the office today for follow-up.  -Currently in the office today patient denies any chest pain, palpitations, lightness or dizziness, loss conscious, shortness of breath, lower extremity edema, orthopnea or bendopnea.  She notes blood pressures are well-controlled.  She notes that there is room for improvement in dietary and lifestyle modifications but overall remains reasonably active and can easily walk 4 city blocks on flat surface or up 2 flights of stairs with no chest pain or anginal symptoms.      Most recent or relevant cardiac/vascular testing:    -Coronary CTA 9/5/2023 showing total calcium score of 19 with coronary arteries arising in the normal position with right coronary dominance and typical bifurcation of the left main into the LAD and circumflex vessels.  There was mild plaque of the left main and LAD/diagonal branches with no significant atherosclerotic plaque of the circumflex vessel and no significant plaque of the RCA with CT valve appearance normal, normal pericardial contour with no effusion or calcifications and no abnormal myocardial thinning, thickening or calcifications visualized thoracic aorta and central pulmonary arterial tree within normal limits for patient's age.  Overall report listed minimal nonobstructive CAD with recommendation for preventative therapy and risk factor modification.      Past Surgical History:   Procedure Laterality Date    BREAST BIOPSY Bilateral benign 20yrs ago    COLONOSCOPY           Review of Systems   Review of Systems   Constitutional:  Negative for chills, diaphoresis, fatigue and fever.   HENT:  Negative for trouble swallowing and voice change.    Eyes:  Negative for pain and redness.   Respiratory:  Negative for  "shortness of breath and wheezing.    Cardiovascular:  Negative for chest pain, palpitations and leg swelling.   Gastrointestinal:  Negative for abdominal pain, constipation, diarrhea, nausea and vomiting.   Genitourinary:  Negative for dysuria.   Musculoskeletal:  Positive for arthralgias. Negative for neck pain and neck stiffness.   Skin:  Negative for rash.   Neurological:  Negative for dizziness, syncope, light-headedness and headaches.   Psychiatric/Behavioral:  Negative for agitation and confusion.    All other systems reviewed and are negative.       Objective:     /82   Pulse 92   Temp 97.8 °F (36.6 °C)   Ht 5' 9\" (1.753 m)   Wt 75.3 kg (166 lb)   SpO2 96%   BMI 24.51 kg/m²     PHYSICAL EXAM:  Physical Exam  Vitals reviewed.   Constitutional:       General: She is not in acute distress.     Appearance: Normal appearance. She is not diaphoretic.   HENT:      Head: Normocephalic and atraumatic.   Eyes:      General:         Right eye: No discharge.         Left eye: No discharge.   Neck:      Comments: Trachea midline, no significant JVD appreciated  Cardiovascular:      Rate and Rhythm: Normal rate and regular rhythm.      Heart sounds:      No friction rub.   Pulmonary:      Effort: Pulmonary effort is normal. No respiratory distress.      Breath sounds: No wheezing.   Chest:      Chest wall: No tenderness.   Abdominal:      General: Bowel sounds are normal.      Palpations: Abdomen is soft.      Tenderness: There is no abdominal tenderness. There is no rebound.   Musculoskeletal:      Right lower leg: No edema.      Left lower leg: No edema.   Skin:     General: Skin is warm and dry.   Neurological:      Mental Status: She is alert.      Comments: Awake, alert, able to answer questions appropriately, able to move extremities bilaterally.   Psychiatric:         Mood and Affect: Mood normal.         Behavior: Behavior normal.        Meds reviewed.  Current Outpatient Medications on File Prior to " Visit   Medication Sig Dispense Refill    famotidine (PEPCID) 40 MG tablet take 1 tablet by mouth once daily 2 HOURS PRIOR TO BEDTIME 90 tablet 1    omeprazole (PriLOSEC) 40 MG capsule Take 1 capsule (40 mg total) by mouth daily Take 30 minutes to 1 hour before 1 meal a day 90 capsule 4    predniSONE 10 mg tablet 4 pills for 2 days, then 3 pills for 2 days, then 2 pills for 2 days then 1 pill for 2 days. 20 tablet 0    promethazine-dextromethorphan (PHENERGAN-DM) 6.25-15 mg/5 mL oral syrup Take 5 mL by mouth 4 (four) times a day as needed for cough 118 mL 0     No current facility-administered medications on file prior to visit.      Past Medical History:   Diagnosis Date    BRCA1 negative     BRCA2 negative     Diverticulosis     GERD (gastroesophageal reflux disease)     Hepatitis C     Positive colorectal cancer screening using Cologuard test 1/26/2022     Social History     Tobacco Use   Smoking Status Former   Smokeless Tobacco Never     Family History   Problem Relation Age of Onset    Breast cancer Mother 75    No Known Problems Maternal Grandmother     No Known Problems Paternal Grandmother     No Known Problems Maternal Aunt

## 2024-12-18 ENCOUNTER — DOCUMENTATION (OUTPATIENT)
Dept: GASTROENTEROLOGY | Facility: CLINIC | Age: 69
End: 2024-12-18

## 2024-12-18 NOTE — PROGRESS NOTES
Records reviewed and outlined below in preparation for office visit 12/19/2024;    Family history:  Father may have had colon polyps.  Dad had peptic ulcer disease  Mom had breast cancer  Paternal uncle had colon cancer at age 68.     Laboratory and endoscopic data reviewed and outlined below    7/9/2024 EGD  Duodenum normal  Patchy antral erythema and few scattered erosions.  Biopsy chronic inactive gastritis with intestinal metaplasia.  Negative for H. pylori  Erythema greater curvature distal gastric body.  Biopsy revealed chronic inactive gastritis.  Negative for H. pylori  Several fundic gland appearing polyps in the gastric body and fundus all less than 5 mm  Narrowing versus inability to completely distend the gastric body of uncertain significance.  No evidence of extrinsic compression or mass lesion noted on CAT scan done January 2024.  Suspect variation of normal  Small sliding had a hernia  Healed esophagitis  Z-line regular at 40 cm  No evidence  of residual esophageal polyp at 30 cm     2/20/2024 laboratory data  Cholesterol 218  Triglycerides 97  HDL 78    Sodium 142  Potassium 4.1  BUN 13  Creatinine 0.76  AST 17  ALT 16  Alk phos 46  Albumin 4.4  T. bili 0.73  WBC 6.7 Hgb 14.8 HCT 48  platelet count 334,000     1/31/2024 CT scan abdomen pelvis with IV contrast  Small hiatal hernia  Liver/biliary tree unremarkable  No calcified gallstone  Splenic cyst measuring 1.6 cm.  Spleen is normal in size  Fatty atrophy noted in the pancreas most prominent in the pancreatic head  Bilateral adrenal nodules measuring 0.9 cm  Diverticulosis without diverticulitis.  Mild luminal narrowing within the distal gastric body.  This may be secondary to transient peristalsis.  This is of indeterminate significance and may correspond to the findings described on recent EGD.  No clear source of extrinsic compression     1/17/2024 EGD  -Normal duodenum to the second portion.  Biopsies negative for celiac  disease.  -Mild antral erythema.  Biopsy negative for H. pylori  -Linear erythema with erosions greater curvature distal body of the stomach.  Biopsy revealed chronic inactive gastritis and edema.  Negative for H. pylori.  -Limited distensibility of the mid gastric body of uncertain significance may be a normal variant versus extrinsic compression versus large hiatal hernia which I felt was less likely  -Probable small 1 cm sliding hiatal hernia  -Several small polyps in the fundus.  Biopsies revealed fundic gland polyps  -LA grade B erosive esophagitis  -Torturous distal third esophagus  -Esophageal polyp measuring 5 to 6 mm at 30 cm.  Biopsy revealed squamous papilloma.  Cannot exclude HPV effect  Increased omeprazole to 40 mg daily  Continue famotidine 40 mg 2 hours before bed     9/5/2023 cardiac CT  According to cardiology, the coronary CTA showed no obstructive coronary artery disease.     8/3/2023  Fecal fat normal  Fecal elastase low at 131        8/7/2023 ultrasound abdomen  Liver normal measuring 17.1 cm  Moderate diffuse increased echogenicity consistent with moderate hepatic steatosis  Gallbladder normal  Spleen normal with simple cyst measuring 1.4 x 1.2 x 1.2 cm           2/14/2023 laboratory data  Sodium 141  Potassium 4.0  Creatinine 0.63  AST 16  ALT 21  Alk phos 70  Albumin 3.9  Vitamin D 41.7  WC 6.06 Hgb 14.9 HCT 45.4 MCV 96 platelet count 328,000  Hemoglobin A1c 5.1  TSH 1.09           3/10/2022 colonoscopy  [place] bowel prep score 6  Pediatric scope  4 mm polyp distal rectum status post cold snare polypectomy.  Tubular adenoma  12 mm semipedunculated polyp hepatic flexure status post hot snare polypectomy.  Tubular adenoma  8 mm polyp mid ascending colon status post cold snare polypectomy.  Tubular adenoma  6 mm flat polyp distal ascending colon status post cold snare polypectomy.  Tubular adenoma  Moderate diverticulosis sigmoid colon  ?  Mild rectal prolapse

## 2024-12-19 ENCOUNTER — OFFICE VISIT (OUTPATIENT)
Dept: GASTROENTEROLOGY | Facility: CLINIC | Age: 69
End: 2024-12-19
Payer: MEDICARE

## 2024-12-19 VITALS
WEIGHT: 165 LBS | DIASTOLIC BLOOD PRESSURE: 78 MMHG | OXYGEN SATURATION: 98 % | BODY MASS INDEX: 24.44 KG/M2 | RESPIRATION RATE: 16 BRPM | TEMPERATURE: 97.9 F | HEART RATE: 74 BPM | HEIGHT: 69 IN | SYSTOLIC BLOOD PRESSURE: 114 MMHG

## 2024-12-19 DIAGNOSIS — Z79.899 MEDICATION MANAGEMENT: ICD-10-CM

## 2024-12-19 DIAGNOSIS — K31.A11 INTESTINAL METAPLASIA OF ANTRUM OF STOMACH WITHOUT DYSPLASIA: ICD-10-CM

## 2024-12-19 DIAGNOSIS — Z86.0100 HISTORY OF COLON POLYPS: ICD-10-CM

## 2024-12-19 DIAGNOSIS — R19.7 INTERMITTENT DIARRHEA: ICD-10-CM

## 2024-12-19 DIAGNOSIS — K21.00 GASTROESOPHAGEAL REFLUX DISEASE WITH ESOPHAGITIS WITHOUT HEMORRHAGE: Primary | ICD-10-CM

## 2024-12-19 DIAGNOSIS — K76.0 FATTY LIVER: ICD-10-CM

## 2024-12-19 DIAGNOSIS — K22.81 ESOPHAGEAL POLYP: ICD-10-CM

## 2024-12-19 PROCEDURE — 99214 OFFICE O/P EST MOD 30 MIN: CPT | Performed by: INTERNAL MEDICINE

## 2024-12-19 PROCEDURE — G2211 COMPLEX E/M VISIT ADD ON: HCPCS | Performed by: INTERNAL MEDICINE

## 2024-12-19 RX ORDER — SODIUM CHLORIDE, SODIUM LACTATE, POTASSIUM CHLORIDE, CALCIUM CHLORIDE 600; 310; 30; 20 MG/100ML; MG/100ML; MG/100ML; MG/100ML
125 INJECTION, SOLUTION INTRAVENOUS CONTINUOUS
OUTPATIENT
Start: 2024-12-19

## 2024-12-19 NOTE — PATIENT INSTRUCTIONS
History of colon polyps  Patient does have a history of colon polyps as noted below.  I would recommend repeating a colonoscopy in March 2025.    Patient was noted to have 4 mm polyp distal rectum status post cold snare polypectomy.  Tubular adenoma  12 mm semipedunculated polyp hepatic flexure status post hot snare polypectomy.  Tubular adenoma  8 mm polyp mid ascending colon status post cold snare polypectomy.  Tubular adenoma  6 mm flat polyp distal ascending colon status post cold snare polypectomy.  Tubular adenoma    Patient will be scheduled for colonoscopy for March 2025.  She will receive a Colyte preparation split dose with the second dose completed 3 hours prior to arrival.  2 bisacodyl tablets.  I explained to the patient the procedure of colonoscopy as well as its potential risks which are approximately 3 in 1000 chance of bleeding, infection, and perforation.  Perforation may require a surgeon to repair it.  I also explained the small but significant chance of missing lesions with colonoscopy which has been reported to be about 5-10%.  Pediatric scope      Gastroesophageal reflux disease with esophagitis without hemorrhage  Patient's reflux has been under much better control with the use of omeprazole 40 mg in the morning and famotidine 40 mg in the evening.  She will continue this combination of medication.  Lifestyle modifications for gastroesophageal reflux disease were discussed and include limiting fried and fatty foods, mints, chocolates, carbonated and caffeinated beverages , and alcohol, etc.  Avoid lying down for 2-3 hours after meals.  If you have nighttime symptoms consider raising the head of the bed up on 4-6 inch blocks.  Pillows typically are not useful.  If you are overweight, weight loss will be helpful.  Lily was noted to have LA grade B erosive esophagitis on prior endoscopy.  Repeat EGD July 9, 2024 did reveal healed esophagitis.  No sign of Pate's esophagus.    Esophageal  polyp  Lily was noted to have a polyp at 30 cm in the esophagus.  Pathology revealed a squamous papilloma.  The pathologist stated cannot exclude HPV effect.  There is no evidence of residual polyp in the esophagus on follow-up EGD in July.    Fatty liver  Patient was noted to have fatty liver on ultrasound done in 2023.  She does not have diabetes or hyperlipidemia.  Her body mass index is within the normal range.  She rarely if ever drinks alcohol.  Her liver enzymes have been normal.  The exact cause for her fatty liver is not clear.  Interestingly recent CAT scan did not report fatty liver.  For completeness I will order an ultrasound elastography to evaluate for any potential scarring.    Intestinal metaplasia of antrum of stomach without dysplasia  While EGD from January 2024 did not reveal any intestinal metaplasia on gastric biopsies.  EGD from July 2024 did reveal chronic inactive gastritis with intestinal metaplasia and negative for dysplasia.  Biopsies were negative for H. pylori.  Biopsy of the gastric body did not reveal any intestinal metaplasia.  I did recommend that she undergo repeat upper endoscopy around July 2026 to follow-up on this and to do gastric mapping at that time.

## 2024-12-19 NOTE — ASSESSMENT & PLAN NOTE
Patient does have a history of colon polyps as noted below.  I would recommend repeating a colonoscopy in March 2025.    Patient was noted to have 4 mm polyp distal rectum status post cold snare polypectomy.  Tubular adenoma  12 mm semipedunculated polyp hepatic flexure status post hot snare polypectomy.  Tubular adenoma  8 mm polyp mid ascending colon status post cold snare polypectomy.  Tubular adenoma  6 mm flat polyp distal ascending colon status post cold snare polypectomy.  Tubular adenoma      Patient will be scheduled for colonoscopy for March 2025.  She will receive a Colyte preparation split dose with the second dose completed 3 hours prior to arrival.  2 bisacodyl tablets.  I explained to the patient the procedure of colonoscopy as well as its potential risks which are approximately 3 in 1000 chance of bleeding, infection, and perforation.  Perforation may require a surgeon to repair it.  I also explained the small but significant chance of missing lesions with colonoscopy which has been reported to be about 5-10%.  Pediatric scope    Orders:  •  Colonoscopy; Future  •  polyethylene glycol (COLYTE) 4000 mL solution; Take 4,000 mL by mouth once for 1 dose Take 4000 mL by mouth once for 1 dose. Use as directed

## 2024-12-19 NOTE — ASSESSMENT & PLAN NOTE
Lily was noted to have a polyp at 30 cm in the esophagus.  Pathology revealed a squamous papilloma.  The pathologist stated cannot exclude HPV effect.  There is no evidence of residual polyp in the esophagus on follow-up EGD in July.

## 2024-12-19 NOTE — ASSESSMENT & PLAN NOTE
Patient was noted to have fatty liver on ultrasound done in 2023.  She does not have diabetes or hyperlipidemia.  Her body mass index is within the normal range.  She rarely if ever drinks alcohol.  Her liver enzymes have been normal.  The exact cause for her fatty liver is not clear.  Interestingly recent CAT scan did not report fatty liver.  For completeness I will order an ultrasound elastography to evaluate for any potential scarring.  Orders:  •  US elastography/UGAP; Future  •  CBC; Future  •  Comprehensive metabolic panel; Future

## 2024-12-19 NOTE — ASSESSMENT & PLAN NOTE
While EGD from January 2024 did not reveal any intestinal metaplasia on gastric biopsies.  EGD from July 2024 did reveal chronic inactive gastritis with intestinal metaplasia and negative for dysplasia.  Biopsies were negative for H. pylori.  Biopsy of the gastric body did not reveal any intestinal metaplasia.  I did recommend that she undergo repeat upper endoscopy around July 2026 to follow-up on this and to do gastric mapping at that time.  Orders:  •  Folate; Future  •  Vitamin B12; Future  •  Magnesium; Future

## 2024-12-19 NOTE — ASSESSMENT & PLAN NOTE
Patient's reflux has been under much better control with the use of omeprazole 40 mg in the morning and famotidine 40 mg in the evening.  She will continue this combination of medication.  Lifestyle modifications for gastroesophageal reflux disease were discussed and include limiting fried and fatty foods, mints, chocolates, carbonated and caffeinated beverages , and alcohol, etc.  Avoid lying down for 2-3 hours after meals.  If you have nighttime symptoms consider raising the head of the bed up on 4-6 inch blocks.  Pillows typically are not useful.  If you are overweight, weight loss will be helpful.  Lily was noted to have LA grade B erosive esophagitis on prior endoscopy.  Repeat EGD July 9, 2024 did reveal healed esophagitis.  No sign of Pate's esophagus.

## 2024-12-19 NOTE — PROGRESS NOTES
Name: Lily Phillipsoucq      : 1955      MRN: [id number]  Encounter Provider: Chato Catherine DO  Encounter Date: 2024   Encounter department: [facility] [place]  :  Assessment & Plan  Gastroesophageal reflux disease with esophagitis without hemorrhage  Patient's reflux has been under much better control with the use of omeprazole 40 mg in the morning and famotidine 40 mg in the evening.  She will continue this combination of medication.  Lifestyle modifications for gastroesophageal reflux disease were discussed and include limiting fried and fatty foods, mints, chocolates, carbonated and caffeinated beverages , and alcohol, etc.  Avoid lying down for 2-3 hours after meals.  If you have nighttime symptoms consider raising the head of the bed up on 4-6 inch blocks.  Pillows typically are not useful.  If you are overweight, weight loss will be helpful.  Lily was noted to have LA grade B erosive esophagitis on prior endoscopy.  Repeat EGD 2024 did reveal healed esophagitis.  No sign of Pate's esophagus.       Fatty liver  Patient was noted to have fatty liver on ultrasound done in .  She does not have diabetes or hyperlipidemia.  Her body mass index is within the normal range.  She rarely if ever drinks alcohol.  Her liver enzymes have been normal.  The exact cause for her fatty liver is not clear.  Interestingly recent CAT scan did not report fatty liver.  For completeness I will order an ultrasound elastography to evaluate for any potential scarring.  Orders:  •  US elastography/UGAP; Future  •  CBC; Future  •  Comprehensive metabolic panel; Future    Esophageal polyp  Lily was noted to have a polyp at 30 cm in the esophagus.  Pathology revealed a squamous papilloma.  The pathologist stated cannot exclude HPV effect.  There is no evidence of residual polyp in the esophagus on follow-up EGD in July.       History of colon polyps  Patient does have a  history of colon polyps as noted below.  I would recommend repeating a colonoscopy in March 2025.    Patient was noted to have 4 mm polyp distal rectum status post cold snare polypectomy.  Tubular adenoma  12 mm semipedunculated polyp hepatic flexure status post hot snare polypectomy.  Tubular adenoma  8 mm polyp mid ascending colon status post cold snare polypectomy.  Tubular adenoma  6 mm flat polyp distal ascending colon status post cold snare polypectomy.  Tubular adenoma      Patient will be scheduled for colonoscopy for March 2025.  She will receive a Colyte preparation split dose with the second dose completed 3 hours prior to arrival.  2 bisacodyl tablets.  I explained to the patient the procedure of colonoscopy as well as its potential risks which are approximately 3 in 1000 chance of bleeding, infection, and perforation.  Perforation may require a surgeon to repair it.  I also explained the small but significant chance of missing lesions with colonoscopy which has been reported to be about 5-10%.  Pediatric scope    Orders:  •  Colonoscopy; Future  •  polyethylene glycol (COLYTE) 4000 mL solution; Take 4,000 mL by mouth once for 1 dose Take 4000 mL by mouth once for 1 dose. Use as directed    Intermittent diarrhea  Patient reports that she is having less episodes of intermittent diarrhea.  She finds that this may be triggered by dairy products.  She was noted to have a low fecal elastase but normal fecal fat back in August 2023.  She does not have any signs or symptoms of exocrine pancreatic insufficiency.  CT was performed and revealed a normal-appearing pancreas       Intestinal metaplasia of antrum of stomach without dysplasia  While EGD from January 2024 did not reveal any intestinal metaplasia on gastric biopsies.  EGD from July 2024 did reveal chronic inactive gastritis with intestinal metaplasia and negative for dysplasia.  Biopsies were negative for H. pylori.  Biopsy of the gastric body did not  reveal any intestinal metaplasia.  I did recommend that she undergo repeat upper endoscopy around July 2026 to follow-up on this and to do gastric mapping at that time.  Orders:  •  Folate; Future  •  Vitamin B12; Future  •  Magnesium; Future    Medication management    Orders:  •  CBC; Future  •  Folate; Future  •  Vitamin B12; Future  •  Magnesium; Future  •  Comprehensive metabolic panel; Future        History of Present Illness   HPI  Lily Zhang is a 69 y.o. female who presents for follow-up of Gastrosoft reflux disease with erosive esophagitis, antral intestinal metaplasia and intermittent diarrhea.  Lily reports that soon after Thanksgiving she became ill with chills, fever, body aches in addition to nausea vomiting and diarrhea that lasted for about 3 days.  Multiple other family members had similar symptoms.  Fortunately her symptoms completely resolved.    Her bowel habits have been fairly regular for the most part.  They are typically formed.  Intermittently, probably less than once a month, she would develop diarrhea.  This will be preceded by abdominal cramping which is later relief of the diarrhea.  She has identified dairy products as a trigger for this.    Her reflux is under much better control with use of omeprazole 40 mg in the morning and famotidine 40 mg in the evening.  She denies any dysphagia, nausea or vomiting or early satiety at this time.  There is been no unintentional weight loss.        Family history:  Father may have had colon polyps.  Dad had peptic ulcer disease  Mom had breast cancer  Paternal uncle had colon cancer at age 68.     Laboratory and endoscopic data reviewed and outlined below     7/9/2024 EGD  Duodenum normal  Patchy antral erythema and few scattered erosions.  Biopsy chronic inactive gastritis with intestinal metaplasia.  Negative for H. pylori  Erythema greater curvature distal gastric body.  Biopsy revealed chronic inactive gastritis.  Negative for H.  pylori  Several fundic gland appearing polyps in the gastric body and fundus all less than 5 mm  Narrowing versus inability to completely distend the gastric body of uncertain significance.  No evidence of extrinsic compression or mass lesion noted on CAT scan done January 2024.  Suspect variation of normal  Small sliding had a hernia  Healed esophagitis  Z-line regular at 40 cm  No evidence  of residual esophageal polyp at 30 cm     2/20/2024 laboratory data  Cholesterol 218  Triglycerides 97  HDL 78    Sodium 142  Potassium 4.1  BUN 13  Creatinine 0.76  AST 17  ALT 16  Alk phos 46  Albumin 4.4  T. bili 0.73  WBC 6.7 Hgb 14.8 HCT 48  platelet count 334,000     1/31/2024 CT scan abdomen pelvis with IV contrast  Small hiatal hernia  Liver/biliary tree unremarkable  No calcified gallstone  Splenic cyst measuring 1.6 cm.  Spleen is normal in size  Fatty atrophy noted in the pancreas most prominent in the pancreatic head  Bilateral adrenal nodules measuring 0.9 cm  Diverticulosis without diverticulitis.  Mild luminal narrowing within the distal gastric body.  This may be secondary to transient peristalsis.  This is of indeterminate significance and may correspond to the findings described on recent EGD.  No clear source of extrinsic compression     1/17/2024 EGD  -Normal duodenum to the second portion.  Biopsies negative for celiac disease.  -Mild antral erythema.  Biopsy negative for H. pylori  -Linear erythema with erosions greater curvature distal body of the stomach.  Biopsy revealed chronic inactive gastritis and edema.  Negative for H. pylori.  -Limited distensibility of the mid gastric body of uncertain significance may be a normal variant versus extrinsic compression versus large hiatal hernia which I felt was less likely  -Probable small 1 cm sliding hiatal hernia  -Several small polyps in the fundus.  Biopsies revealed fundic gland polyps  -LA grade B erosive esophagitis  -Torturous distal third  esophagus  -Esophageal polyp measuring 5 to 6 mm at 30 cm.  Biopsy revealed squamous papilloma.  Cannot exclude HPV effect  Increased omeprazole to 40 mg daily  Continue famotidine 40 mg 2 hours before bed     9/5/2023 cardiac CT  According to cardiology, the coronary CTA showed no obstructive coronary artery disease.     8/3/2023  Fecal fat normal  Fecal elastase low at 131        8/7/2023 ultrasound abdomen  Liver normal measuring 17.1 cm  Moderate diffuse increased echogenicity consistent with moderate hepatic steatosis  Gallbladder normal  Spleen normal with simple cyst measuring 1.4 x 1.2 x 1.2 cm           2/14/2023 laboratory data  Sodium 141  Potassium 4.0  Creatinine 0.63  AST 16  ALT 21  Alk phos 70  Albumin 3.9  Vitamin D 41.7  WC 6.06 Hgb 14.9 HCT 45.4 MCV 96 platelet count 328,000  Hemoglobin A1c 5.1  TSH 1.09           3/10/2022 colonoscopy  [place] bowel prep score 6  Pediatric scope  4 mm polyp distal rectum status post cold snare polypectomy.  Tubular adenoma  12 mm semipedunculated polyp hepatic flexure status post hot snare polypectomy.  Tubular adenoma  8 mm polyp mid ascending colon status post cold snare polypectomy.  Tubular adenoma  6 mm flat polyp distal ascending colon status post cold snare polypectomy.  Tubular adenoma  Moderate diverticulosis sigmoid colon  ?  Mild rectal prolapse      Review of Systems  Past Medical History   Past Medical History:   Diagnosis Date   • BRCA1 negative    • BRCA2 negative    • Diverticulosis    • GERD (gastroesophageal reflux disease)    • Hepatitis C    • Positive colorectal cancer screening using Cologuard test 1/26/2022     Past Surgical History:   Procedure Laterality Date   • BREAST BIOPSY Bilateral benign 20yrs ago   • COLONOSCOPY       Family History   Problem Relation Age of Onset   • Breast cancer Mother 75   • No Known Problems Maternal Grandmother    • No Known Problems Paternal Grandmother    • No Known Problems Maternal Aunt       reports that  "she has quit smoking. She has never used smokeless tobacco. She reports current alcohol use. She reports that she does not use drugs.  Current Outpatient Medications on File Prior to Visit   Medication Sig Dispense Refill   • famotidine (PEPCID) 40 MG tablet take 1 tablet by mouth once daily 2 HOURS PRIOR TO BEDTIME 90 tablet 1   • omeprazole (PriLOSEC) 40 MG capsule Take 1 capsule (40 mg total) by mouth daily Take 30 minutes to 1 hour before 1 meal a day 90 capsule 4   • predniSONE 10 mg tablet 4 pills for 2 days, then 3 pills for 2 days, then 2 pills for 2 days then 1 pill for 2 days. 20 tablet 0   • promethazine-dextromethorphan (PHENERGAN-DM) 6.25-15 mg/5 mL oral syrup Take 5 mL by mouth 4 (four) times a day as needed for cough 118 mL 0     No current facility-administered medications on file prior to visit.     Allergies   Allergen Reactions   • Tetracycline Rash      Current Outpatient Medications on File Prior to Visit   Medication Sig Dispense Refill   • famotidine (PEPCID) 40 MG tablet take 1 tablet by mouth once daily 2 HOURS PRIOR TO BEDTIME 90 tablet 1   • omeprazole (PriLOSEC) 40 MG capsule Take 1 capsule (40 mg total) by mouth daily Take 30 minutes to 1 hour before 1 meal a day 90 capsule 4   • predniSONE 10 mg tablet 4 pills for 2 days, then 3 pills for 2 days, then 2 pills for 2 days then 1 pill for 2 days. 20 tablet 0   • promethazine-dextromethorphan (PHENERGAN-DM) 6.25-15 mg/5 mL oral syrup Take 5 mL by mouth 4 (four) times a day as needed for cough 118 mL 0     No current facility-administered medications on file prior to visit.      Social History     Tobacco Use   • Smoking status: Former   • Smokeless tobacco: Never   Vaping Use   • Vaping status: Never Used   Substance and Sexual Activity   • Alcohol use: Yes     Comment: social   • Drug use: Never   • Sexual activity: Yes     Partners: Male        Objective   /78   Pulse 74   Temp 97.9 °F (36.6 °C)   Resp 16   Ht 5' 9\" (1.753 m)   " Wt 74.8 kg (165 lb)   SpO2 98%   BMI 24.37 kg/m²      Physical Exam    General Appearance: Alert, cooperative, no distress  HEENT: Normocephalic, atraumatic, anicteric.   Neck: Supple, symmetrical, trachea midline  Lungs: Clear to auscultation bilaterally; no rales, rhonchi or wheezing; respirations unlabored   Heart: Regular rate and rhythm; no murmur, rub, or gallop.  Abdomen: Soft, bowel sounds normal, non-tender, non-distended; no masses, there is no hepatosplenomegaly. No spider angiomas    Genitalia: Deferred   Rectal: Deferred   Extremities: No cyanosis, clubbing or edema   Skin: No jaundice, rashes, or lesions   Lymph nodes: No palpable cervical lymphadenopathy

## 2024-12-19 NOTE — ASSESSMENT & PLAN NOTE
Patient reports that she is having less episodes of intermittent diarrhea.  She finds that this may be triggered by dairy products.  She was noted to have a low fecal elastase but normal fecal fat back in August 2023.  She does not have any signs or symptoms of exocrine pancreatic insufficiency.  CT was performed and revealed a normal-appearing pancreas

## 2024-12-23 ENCOUNTER — HOSPITAL ENCOUNTER (OUTPATIENT)
Dept: MAMMOGRAPHY | Facility: HOSPITAL | Age: 69
Discharge: HOME | End: 2024-12-23
Attending: SPECIALIST
Payer: MEDICARE

## 2024-12-23 ENCOUNTER — HOSPITAL ENCOUNTER (OUTPATIENT)
Dept: BONE DENSITY | Facility: HOSPITAL | Age: 69
Discharge: HOME | End: 2024-12-23
Attending: SPECIALIST
Payer: MEDICARE

## 2024-12-23 VITALS — WEIGHT: 160 LBS | BODY MASS INDEX: 24.25 KG/M2 | HEIGHT: 68 IN

## 2024-12-23 DIAGNOSIS — Z78.0 ASYMPTOMATIC MENOPAUSAL STATE: ICD-10-CM

## 2024-12-23 DIAGNOSIS — Z12.31 ENCOUNTER FOR SCREENING MAMMOGRAM FOR MALIGNANT NEOPLASM OF BREAST: ICD-10-CM

## 2024-12-23 PROCEDURE — 77067 SCR MAMMO BI INCL CAD: CPT

## 2024-12-23 PROCEDURE — 77063 BREAST TOMOSYNTHESIS BI: CPT

## 2024-12-23 PROCEDURE — 77080 DXA BONE DENSITY AXIAL: CPT

## 2025-01-12 ENCOUNTER — OFFICE VISIT (OUTPATIENT)
Dept: URGENT CARE | Facility: CLINIC | Age: 70
End: 2025-01-12
Payer: MEDICARE

## 2025-01-12 VITALS
BODY MASS INDEX: 24.73 KG/M2 | HEIGHT: 69 IN | HEART RATE: 91 BPM | SYSTOLIC BLOOD PRESSURE: 120 MMHG | WEIGHT: 167 LBS | TEMPERATURE: 97.8 F | RESPIRATION RATE: 20 BRPM | OXYGEN SATURATION: 98 % | DIASTOLIC BLOOD PRESSURE: 73 MMHG

## 2025-01-12 DIAGNOSIS — J01.90 ACUTE SINUSITIS, RECURRENCE NOT SPECIFIED, UNSPECIFIED LOCATION: Primary | ICD-10-CM

## 2025-01-12 PROCEDURE — G0463 HOSPITAL OUTPT CLINIC VISIT: HCPCS | Performed by: PHYSICIAN ASSISTANT

## 2025-01-12 PROCEDURE — 99213 OFFICE O/P EST LOW 20 MIN: CPT | Performed by: PHYSICIAN ASSISTANT

## 2025-01-12 RX ORDER — BENZONATATE 200 MG/1
200 CAPSULE ORAL 3 TIMES DAILY PRN
Qty: 20 CAPSULE | Refills: 0 | Status: SHIPPED | OUTPATIENT
Start: 2025-01-12

## 2025-01-12 RX ORDER — PREDNISONE 10 MG/1
TABLET ORAL
Qty: 26 TABLET | Refills: 0 | Status: SHIPPED | OUTPATIENT
Start: 2025-01-12

## 2025-01-12 NOTE — PROGRESS NOTES
[facility]    NAME: Lily Zhang is a 69 y.o. female  : 1955    MRN: [id number]  DATE: 2025  TIME: 1:17 PM    Assessment and Plan   Acute sinusitis, recurrence not specified, unspecified location [J01.90]  1. Acute sinusitis, recurrence not specified, unspecified location  amoxicillin-clavulanate (AUGMENTIN) 875-125 mg per tablet    predniSONE 10 mg tablet    benzonatate (TESSALON) 200 MG capsule          Patient Instructions     Patient Instructions   I have prescribed an antibiotic for the infection.  Please take the antibiotic as prescribed and finish the entire prescription.  Take an over the counter probiotic or eat yogurt with live cultures in it (activia) to keep good bacteria in the gut and help prevent diarrhea.  Wash hands frequently to prevent the spread of infection.  Can use over the counter cough and cold medications to help with symptoms.  Ibuprofen and/or tylenol as needed for pain or fever.  If not improving over the next 7-10 days, follow up with PCP.          Chief Complaint     Chief Complaint   Patient presents with    Cough     Dry cough, chest tightness, sinus congestion, headache x 6 days.       History of Present Illness   69-year-old female here with complaint of sinus pressure and sinus congestion.  Symptoms have been present for the last 5 to 6 days.  Has cough and postnasal drip.  No fever or chills.        Review of Systems   Review of Systems   Constitutional:  Negative for appetite change, chills and fever.   HENT:  Positive for congestion, postnasal drip and sinus pressure. Negative for ear discharge, ear pain, facial swelling, sneezing and sore throat.    Respiratory:  Positive for cough. Negative for shortness of breath and wheezing.    Neurological:  Negative for headaches.       Current Medications     Current Outpatient Medications:     amoxicillin-clavulanate (AUGMENTIN) 875-125 mg per tablet, Take 1 tablet by mouth every 12 (twelve) hours for 10  days, Disp: 20 tablet, Rfl: 0    benzonatate (TESSALON) 200 MG capsule, Take 1 capsule (200 mg total) by mouth 3 (three) times a day as needed for cough, Disp: 20 capsule, Rfl: 0    Denosumab (PROLIA SC), Inject under the skin, Disp: , Rfl:     famotidine (PEPCID) 40 MG tablet, take 1 tablet by mouth once daily 2 HOURS PRIOR TO BEDTIME, Disp: 90 tablet, Rfl: 1    omeprazole (PriLOSEC) 40 MG capsule, Take 1 capsule (40 mg total) by mouth daily Take 30 minutes to 1 hour before 1 meal a day, Disp: 90 capsule, Rfl: 4    predniSONE 10 mg tablet, Take 3 tabs BID X 2 days, 2 tabs BID X 2 days, 1 tab BID X 2 days, 1 tab daily X 2 days, Disp: 26 tablet, Rfl: 0    polyethylene glycol (COLYTE) 4000 mL solution, Take 4,000 mL by mouth once for 1 dose Take 4000 mL by mouth once for 1 dose. Use as directed, Disp: 4000 mL, Rfl: 0    predniSONE 10 mg tablet, 4 pills for 2 days, then 3 pills for 2 days, then 2 pills for 2 days then 1 pill for 2 days., Disp: 20 tablet, Rfl: 0    promethazine-dextromethorphan (PHENERGAN-DM) 6.25-15 mg/5 mL oral syrup, Take 5 mL by mouth 4 (four) times a day as needed for cough, Disp: 118 mL, Rfl: 0    Current Allergies     Allergies as of 01/12/2025 - Reviewed 01/12/2025   Allergen Reaction Noted    Tetracycline Rash 09/02/2020          The following portions of the patient's history were reviewed and updated as appropriate: allergies, current medications, past family history, past medical history, past social history, past surgical history and problem list.   Past Medical History:   Diagnosis Date    BRCA1 negative     BRCA2 negative     Diverticulosis     GERD (gastroesophageal reflux disease)     Hepatitis C     Positive colorectal cancer screening using Cologuard test 1/26/2022     Past Surgical History:   Procedure Laterality Date    BREAST BIOPSY Bilateral benign 20yrs ago    COLONOSCOPY       Family History   Problem Relation Age of Onset    Breast cancer Mother 75    No Known Problems Maternal  "Grandmother     No Known Problems Paternal Grandmother     No Known Problems Maternal Aunt      Social History     Socioeconomic History    Marital status:      Spouse name: Not on file    Number of children: Not on file    Years of education: Not on file    Highest education level: Not on file   Occupational History    Not on file   Tobacco Use    Smoking status: Former    Smokeless tobacco: Never   Vaping Use    Vaping status: Never Used   Substance and Sexual Activity    Alcohol use: Yes     Comment: social    Drug use: Never    Sexual activity: Yes     Partners: Male   Other Topics Concern    Not on file   Social History Narrative    Not on file     Social Drivers of Health     Financial Resource Strain: Low Risk  (2/13/2024)    Overall Financial Resource Strain (CARDIA)     Difficulty of Paying Living Expenses: Not hard at all   Food Insecurity: Not on file   Transportation Needs: No Transportation Needs (2/13/2024)    PRAPARE - Transportation     Lack of Transportation (Medical): No     Lack of Transportation (Non-Medical): No   Physical Activity: Not on file   Stress: Not on file   Social Connections: Not on file   Intimate Partner Violence: Not on file   Housing Stability: Not on file     Medications have been verified.    Objective   /73 (BP Location: Left arm, Patient Position: Sitting, Cuff Size: Standard)   Pulse 91   Temp 97.8 °F (36.6 °C) (Temporal)   Resp 20   Ht 5' 9\" (1.753 m)   Wt 75.8 kg (167 lb)   SpO2 98%   BMI 24.66 kg/m²      Physical Exam   Physical Exam  Vitals and nursing note reviewed.   Constitutional:       General: She is not in acute distress.     Appearance: She is well-developed.   HENT:      Head: Normocephalic and atraumatic.      Right Ear: Tympanic membrane normal.      Left Ear: Tympanic membrane normal.      Nose: Congestion present. No mucosal edema.      Right Sinus: No maxillary sinus tenderness or frontal sinus tenderness.      Left Sinus: No maxillary " sinus tenderness or frontal sinus tenderness.      Mouth/Throat:      Pharynx: No oropharyngeal exudate or posterior oropharyngeal erythema.   Eyes:      Conjunctiva/sclera: Conjunctivae normal.   Cardiovascular:      Rate and Rhythm: Normal rate and regular rhythm.      Heart sounds: Normal heart sounds. No murmur heard.  Pulmonary:      Effort: Pulmonary effort is normal. No respiratory distress.

## 2025-02-14 ENCOUNTER — APPOINTMENT (OUTPATIENT)
Dept: LAB | Facility: HOSPITAL | Age: 70
End: 2025-02-14
Attending: INTERNAL MEDICINE
Payer: MEDICARE

## 2025-02-14 ENCOUNTER — RESULTS FOLLOW-UP (OUTPATIENT)
Dept: GASTROENTEROLOGY | Facility: CLINIC | Age: 70
End: 2025-02-14

## 2025-02-14 DIAGNOSIS — Z79.899 MEDICATION MANAGEMENT: ICD-10-CM

## 2025-02-14 DIAGNOSIS — Z11.59 ENCOUNTER FOR SCREENING FOR OTHER VIRAL DISEASES: ICD-10-CM

## 2025-02-14 DIAGNOSIS — K31.A11 INTESTINAL METAPLASIA OF ANTRUM OF STOMACH WITHOUT DYSPLASIA: ICD-10-CM

## 2025-02-14 DIAGNOSIS — K76.0 FATTY LIVER: ICD-10-CM

## 2025-02-14 LAB
ALBUMIN SERPL BCG-MCNC: 4.2 G/DL (ref 3.5–5)
ALP SERPL-CCNC: 46 U/L (ref 34–104)
ALT SERPL W P-5'-P-CCNC: 17 U/L (ref 7–52)
ANION GAP SERPL CALCULATED.3IONS-SCNC: 6 MMOL/L (ref 4–13)
AST SERPL W P-5'-P-CCNC: 15 U/L (ref 13–39)
BILIRUB SERPL-MCNC: 0.54 MG/DL (ref 0.2–1)
BUN SERPL-MCNC: 15 MG/DL (ref 5–25)
CALCIUM SERPL-MCNC: 9.7 MG/DL (ref 8.4–10.2)
CHLORIDE SERPL-SCNC: 108 MMOL/L (ref 96–108)
CO2 SERPL-SCNC: 29 MMOL/L (ref 21–32)
CREAT SERPL-MCNC: 0.72 MG/DL (ref 0.6–1.3)
ERYTHROCYTE [DISTWIDTH] IN BLOOD BY AUTOMATED COUNT: 12.1 % (ref 11.6–15.1)
FOLATE SERPL-MCNC: 21.4 NG/ML
GFR SERPL CREATININE-BSD FRML MDRD: 86 ML/MIN/1.73SQ M
GLUCOSE P FAST SERPL-MCNC: 89 MG/DL (ref 65–99)
HAV AB SER QL IA: NORMAL
HBV CORE AB SER QL: NORMAL
HBV SURFACE AB SER-ACNC: <3 MIU/ML
HBV SURFACE AG SER QL: NORMAL
HCT VFR BLD AUTO: 46.7 % (ref 34.8–46.1)
HGB BLD-MCNC: 14.9 G/DL (ref 11.5–15.4)
MAGNESIUM SERPL-MCNC: 2.2 MG/DL (ref 1.9–2.7)
MCH RBC QN AUTO: 30.9 PG (ref 26.8–34.3)
MCHC RBC AUTO-ENTMCNC: 31.9 G/DL (ref 31.4–37.4)
MCV RBC AUTO: 97 FL (ref 82–98)
PLATELET # BLD AUTO: 361 THOUSANDS/UL (ref 149–390)
PMV BLD AUTO: 9.6 FL (ref 8.9–12.7)
POTASSIUM SERPL-SCNC: 4.1 MMOL/L (ref 3.5–5.3)
PROT SERPL-MCNC: 7.1 G/DL (ref 6.4–8.4)
RBC # BLD AUTO: 4.82 MILLION/UL (ref 3.81–5.12)
SODIUM SERPL-SCNC: 143 MMOL/L (ref 135–147)
VIT B12 SERPL-MCNC: 198 PG/ML (ref 180–914)
WBC # BLD AUTO: 6.85 THOUSAND/UL (ref 4.31–10.16)

## 2025-02-14 PROCEDURE — 82746 ASSAY OF FOLIC ACID SERUM: CPT

## 2025-02-14 PROCEDURE — 87340 HEPATITIS B SURFACE AG IA: CPT

## 2025-02-14 PROCEDURE — 82607 VITAMIN B-12: CPT

## 2025-02-14 PROCEDURE — 85027 COMPLETE CBC AUTOMATED: CPT

## 2025-02-14 PROCEDURE — 86706 HEP B SURFACE ANTIBODY: CPT

## 2025-02-14 PROCEDURE — 86704 HEP B CORE ANTIBODY TOTAL: CPT

## 2025-02-14 PROCEDURE — 80053 COMPREHEN METABOLIC PANEL: CPT

## 2025-02-14 PROCEDURE — 36415 COLL VENOUS BLD VENIPUNCTURE: CPT

## 2025-02-14 PROCEDURE — 86708 HEPATITIS A ANTIBODY: CPT

## 2025-02-14 PROCEDURE — 83735 ASSAY OF MAGNESIUM: CPT

## 2025-02-25 ENCOUNTER — OFFICE VISIT (OUTPATIENT)
Dept: FAMILY MEDICINE CLINIC | Facility: CLINIC | Age: 70
End: 2025-02-25
Payer: MEDICARE

## 2025-02-25 VITALS
DIASTOLIC BLOOD PRESSURE: 78 MMHG | BODY MASS INDEX: 24.98 KG/M2 | OXYGEN SATURATION: 96 % | HEIGHT: 68 IN | WEIGHT: 164.8 LBS | TEMPERATURE: 98.1 F | SYSTOLIC BLOOD PRESSURE: 112 MMHG | HEART RATE: 86 BPM

## 2025-02-25 DIAGNOSIS — Z00.00 MEDICARE ANNUAL WELLNESS VISIT, SUBSEQUENT: Primary | ICD-10-CM

## 2025-02-25 DIAGNOSIS — E78.49 OTHER HYPERLIPIDEMIA: ICD-10-CM

## 2025-02-25 DIAGNOSIS — K21.9 HIATAL HERNIA WITH GERD WITHOUT ESOPHAGITIS: ICD-10-CM

## 2025-02-25 DIAGNOSIS — K44.9 HIATAL HERNIA WITH GERD WITHOUT ESOPHAGITIS: ICD-10-CM

## 2025-02-25 PROCEDURE — G0439 PPPS, SUBSEQ VISIT: HCPCS | Performed by: INTERNAL MEDICINE

## 2025-02-25 RX ORDER — METRONIDAZOLE 7.5 MG/G
GEL TOPICAL
COMMUNITY
Start: 2024-11-18

## 2025-02-25 NOTE — ASSESSMENT & PLAN NOTE
Currently not on a statin, her risk factor for 10-year coronary or triple vascular event is about 6% without a statin, 5% with a statin.

## 2025-02-25 NOTE — PATIENT INSTRUCTIONS
Medicare Preventive Visit Patient Instructions  Thank you for completing your Welcome to Medicare Visit or Medicare Annual Wellness Visit today. Your next wellness visit will be due in one year (2/26/2026).  The screening/preventive services that you may require over the next 5-10 years are detailed below. Some tests may not apply to you based off risk factors and/or age. Screening tests ordered at today's visit but not completed yet may show as past due. Also, please note that scanned in results may not display below.  Preventive Screenings:  Service Recommendations Previous Testing/Comments   Colorectal Cancer Screening  * Colonoscopy    * Fecal Occult Blood Test (FOBT)/Fecal Immunochemical Test (FIT)  * Fecal DNA/Cologuard Test  * Flexible Sigmoidoscopy Age: 45-75 years old   Colonoscopy: every 10 years (may be performed more frequently if at higher risk)  OR  FOBT/FIT: every 1 year  OR  Cologuard: every 3 years  OR  Sigmoidoscopy: every 5 years  Screening may be recommended earlier than age 45 if at higher risk for colorectal cancer. Also, an individualized decision between you and your healthcare provider will decide whether screening between the ages of 76-85 would be appropriate. Colonoscopy: 03/10/2022  FOBT/FIT: Not on file  Cologuard: Not on file  Sigmoidoscopy: Not on file    Screening Current     Breast Cancer Screening Age: 40+ years old  Frequency: every 1-2 years  Not required if history of left and right mastectomy Mammogram: 12/23/2024    Screening Current   Cervical Cancer Screening Between the ages of 21-29, pap smear recommended once every 3 years.   Between the ages of 30-65, can perform pap smear with HPV co-testing every 5 years.   Recommendations may differ for women with a history of total hysterectomy, cervical cancer, or abnormal pap smears in past. Pap Smear: Not on file    Screening Not Indicated   Hepatitis C Screening Once for adults born between 1945 and 1965  More frequently in  patients at high risk for Hepatitis C Hep C Antibody: 01/26/2022    Screening Not Indicated  History Hepatitis C   Diabetes Screening 1-2 times per year if you're at risk for diabetes or have pre-diabetes Fasting glucose: 89 mg/dL (2/14/2025)  A1C: 5.3 % (2/20/2024)  Screening Current   Cholesterol Screening Once every 5 years if you don't have a lipid disorder. May order more often based on risk factors. Lipid panel: 02/20/2024    Screening Not Indicated  History Lipid Disorder     Other Preventive Screenings Covered by Medicare:  Abdominal Aortic Aneurysm (AAA) Screening: covered once if your at risk. You're considered to be at risk if you have a family history of AAA.  Lung Cancer Screening: covers low dose CT scan once per year if you meet all of the following conditions: (1) Age 55-77; (2) No signs or symptoms of lung cancer; (3) Current smoker or have quit smoking within the last 15 years; (4) You have a tobacco smoking history of at least 20 pack years (packs per day multiplied by number of years you smoked); (5) You get a written order from a healthcare provider.  Glaucoma Screening: covered annually if you're considered high risk: (1) You have diabetes OR (2) Family history of glaucoma OR (3)  aged 50 and older OR (4)  American aged 65 and older  Osteoporosis Screening: covered every 2 years if you meet one of the following conditions: (1) You're estrogen deficient and at risk for osteoporosis based off medical history and other findings; (2) Have a vertebral abnormality; (3) On glucocorticoid therapy for more than 3 months; (4) Have primary hyperparathyroidism; (5) On osteoporosis medications and need to assess response to drug therapy.   Last bone density test (DXA Scan): 12/23/2024.  HIV Screening: covered annually if you're between the age of 15-65. Also covered annually if you are younger than 15 and older than 65 with risk factors for HIV infection. For pregnant patients, it is  covered up to 3 times per pregnancy.    Immunizations:  Immunization Recommendations   Influenza Vaccine Annual influenza vaccination during flu season is recommended for all persons aged >= 6 months who do not have contraindications   Pneumococcal Vaccine   * Pneumococcal conjugate vaccine = PCV13 (Prevnar 13), PCV15 (Vaxneuvance), PCV20 (Prevnar 20)  * Pneumococcal polysaccharide vaccine = PPSV23 (Pneumovax) Adults 19-63 yo with certain risk factors or if 65+ yo  If never received any pneumonia vaccine: recommend Prevnar 20 (PCV20)  Give PCV20 if previously received 1 dose of PCV13 or PPSV23   Hepatitis B Vaccine 3 dose series if at intermediate or high risk (ex: diabetes, end stage renal disease, liver disease)   Respiratory syncytial virus (RSV) Vaccine - COVERED BY MEDICARE PART D  * RSVPreF3 (Arexvy) CDC recommends that adults 60 years of age and older may receive a single dose of RSV vaccine using shared clinical decision-making (SCDM)   Tetanus (Td) Vaccine - COST NOT COVERED BY MEDICARE PART B Following completion of primary series, a booster dose should be given every 10 years to maintain immunity against tetanus. Td may also be given as tetanus wound prophylaxis.   Tdap Vaccine - COST NOT COVERED BY MEDICARE PART B Recommended at least once for all adults. For pregnant patients, recommended with each pregnancy.   Shingles Vaccine (Shingrix) - COST NOT COVERED BY MEDICARE PART B  2 shot series recommended in those 19 years and older who have or will have weakened immune systems or those 50 years and older     Health Maintenance Due:      Topic Date Due   • Colorectal Cancer Screening  03/09/2025   • Breast Cancer Screening: Mammogram  12/23/2026   • DXA SCAN  12/23/2026   • Hepatitis C Screening  Discontinued     Immunizations Due:      Topic Date Due   • Hepatitis B Vaccine (1 of 3 - Risk 3-dose series) Never done   • COVID-19 Vaccine (7 - 2024-25 season) 09/01/2024     Advance Directives   What are  advance directives?  Advance directives are legal documents that state your wishes and plans for medical care. These plans are made ahead of time in case you lose your ability to make decisions for yourself. Advance directives can apply to any medical decision, such as the treatments you want, and if you want to donate organs.   What are the types of advance directives?  There are many types of advance directives, and each state has rules about how to use them. You may choose a combination of any of the following:  Living will:  This is a written record of the treatment you want. You can also choose which treatments you do not want, which to limit, and which to stop at a certain time. This includes surgery, medicine, IV fluid, and tube feedings.   Durable power of  for healthcare (DPAHC):  This is a written record that states who you want to make healthcare choices for you when you are unable to make them for yourself. This person, called a proxy, is usually a family member or a friend. You may choose more than 1 proxy.  Do not resuscitate (DNR) order:  A DNR order is used in case your heart stops beating or you stop breathing. It is a request not to have certain forms of treatment, such as CPR. A DNR order may be included in other types of advance directives.  Medical directive:  This covers the care that you want if you are in a coma, near death, or unable to make decisions for yourself. You can list the treatments you want for each condition. Treatment may include pain medicine, surgery, blood transfusions, dialysis, IV or tube feedings, and a ventilator (breathing machine).  Values history:  This document has questions about your views, beliefs, and how you feel and think about life. This information can help others choose the care that you would choose.  Why are advance directives important?  An advance directive helps you control your care. Although spoken wishes may be used, it is better to have your  wishes written down. Spoken wishes can be misunderstood, or not followed. Treatments may be given even if you do not want them. An advance directive may make it easier for your family to make difficult choices about your care.   Urinary Incontinence   Urinary incontinence (UI)  is when you lose control of your bladder. UI develops because your bladder cannot store or empty urine properly. The 3 most common types of UI are stress incontinence, urge incontinence, or both.  Medicines:   May be given to help strengthen your bladder control. Report any side effects of medication to your healthcare provider.  Do pelvic muscle exercises often:  Your pelvic muscles help you stop urinating. Squeeze these muscles tight for 5 seconds, then relax for 5 seconds. Gradually work up to squeezing for 10 seconds. Do 3 sets of 15 repetitions a day, or as directed. This will help strengthen your pelvic muscles and improve bladder control.  Train your bladder:  Go to the bathroom at set times, such as every 2 hours, even if you do not feel the urge to go. You can also try to hold your urine when you feel the urge to go. For example, hold your urine for 5 minutes when you feel the urge to go. As that becomes easier, hold your urine for 10 minutes.   Self-care:   Keep a UI record.  Write down how often you leak urine and how much you leak. Make a note of what you were doing when you leaked urine.  Drink liquids as directed. You may need to limit the amount of liquid you drink to help control your urine leakage. Do not drink any liquid right before you go to bed. Limit or do not have drinks that contain caffeine or alcohol.   Prevent constipation.  Eat a variety of high-fiber foods. Good examples are high-fiber cereals, beans, vegetables, and whole-grain breads. Walking is the best way to trigger your intestines to have a bowel movement.  Exercise regularly and maintain a healthy weight.  Weight loss and exercise will decrease pressure on  your bladder and help you control your leakage.   Use a catheter as directed  to help empty your bladder. A catheter is a tiny, plastic tube that is put into your bladder to drain your urine.   Go to behavior therapy as directed.  Behavior therapy may be used to help you learn to control your urge to urinate.    Weight Management   Why it is important to manage your weight:  Being overweight increases your risk of health conditions such as heart disease, high blood pressure, type 2 diabetes, and certain types of cancer. It can also increase your risk for osteoarthritis, sleep apnea, and other respiratory problems. Aim for a slow, steady weight loss. Even a small amount of weight loss can lower your risk of health problems.  How to lose weight safely:  A safe and healthy way to lose weight is to eat fewer calories and get regular exercise. You can lose up about 1 pound a week by decreasing the number of calories you eat by 500 calories each day.   Healthy meal plan for weight management:  A healthy meal plan includes a variety of foods, contains fewer calories, and helps you stay healthy. A healthy meal plan includes the following:  Eat whole-grain foods more often.  A healthy meal plan should contain fiber. Fiber is the part of grains, fruits, and vegetables that is not broken down by your body. Whole-grain foods are healthy and provide extra fiber in your diet. Some examples of whole-grain foods are whole-wheat breads and pastas, oatmeal, brown rice, and bulgur.  Eat a variety of vegetables every day.  Include dark, leafy greens such as spinach, kale, mariangel greens, and mustard greens. Eat yellow and orange vegetables such as carrots, sweet potatoes, and winter squash.   Eat a variety of fruits every day.  Choose fresh or canned fruit (canned in its own juice or light syrup) instead of juice. Fruit juice has very little or no fiber.  Eat low-fat dairy foods.  Drink fat-free (skim) milk or 1% milk. Eat fat-free  "yogurt and low-fat cottage cheese. Try low-fat cheeses such as mozzarella and other reduced-fat cheeses.  Choose meat and other protein foods that are low in fat.  Choose beans or other legumes such as split peas or lentils. Choose fish, skinless poultry (chicken or turkey), or lean cuts of red meat (beef or pork). Before you cook meat or poultry, cut off any visible fat.   Use less fat and oil.  Try baking foods instead of frying them. Add less fat, such as margarine, sour cream, regular salad dressing and mayonnaise to foods. Eat fewer high-fat foods. Some examples of high-fat foods include french fries, doughnuts, ice cream, and cakes.  Eat fewer sweets.  Limit foods and drinks that are high in sugar. This includes candy, cookies, regular soda, and sweetened drinks.  Exercise:  Exercise at least 30 minutes per day on most days of the week. Some examples of exercise include walking, biking, dancing, and swimming. You can also fit in more physical activity by taking the stairs instead of the elevator or parking farther away from stores. Ask your healthcare provider about the best exercise plan for you.   Alcohol Use and Your Health    Drinking too much can harm your health.  Excessive alcohol use leads to about 88,000 death in the [place] each year, and shortens the life of those who diet by almost 30 years.  Further, excessive drinking cost the economy $249 billion in 2010.  Most excessive drinkers are not alcohol dependent.    Excessive alcohol use has immediate effects that increase the risk of many harmful health conditions.  These are most often the result of binge drinking.  Over time, excessive alcohol use can lead to the development of chronic diseases and other series health problems.    What is considered a \"drink\"?        Excessive alcohol use includes:  Binge Drinking: For women, 4 or more drinks consumed on one occasion. For men, 5 or more drinks consumed on one occasion.  Heavy Drinking: For " women, 8 or more drinks per week. For men, 15 or more drinks per week  Any alcohol used by pregnant women  Any alcohol used by those under the age of 21 years    If you choose to drink, do so in moderation:  Do not drink at all if you are under the age of 21, or if you are or may be pregnant, or have health problems that could be made worse by drinking.  For women, up to 1 drink per day  For men, up to 2 drinks a day    No one should begin drinking or drink more frequently based on potential health benefits    Short-Term Health Risks:  Injuries: motor vehicle crashes, falls, drownings, burns  Violence: homicide, suicide, sexual assault, intimate partner violence  Alcohol poisoning  Reproductive health: risky sexual behaviors, unintended prengnacy, sexually transmitted diseases, miscarriage, stillbirth, fetal alcohol syndrome    Long-Term Health Risks:  Chronic diseases: high blood pressure, heart disease, stroke, liver disease, digestive problems  Cancers: breast, mouth and throat, liver, colon  Learning and memory problems: dementia, poor school performance  Mental health: depression, anxiety, insomnia  Social problems: lost productivity, family problems, unemployment  Alcohol dependence    For support and more information:  Substance Abuse and Mental Health Services Administration  [address]  [place] [address]  Web Address: http://www.samhsa.gov    Alcoholics Anonymous        Web Address: http://www.aa.org    https://www.cdc.gov/alcohol/fact-sheets/alcohol-use.htm     © Copyright [employer] 2018 Information is for End User's use only and may not be sold, redistributed or otherwise used for commercial purposes. All illustrations and images included in CareNotes® are the copyrighted property of [employer] or [employer]

## 2025-02-25 NOTE — PROGRESS NOTES
Name: Lily Silvacmikey      : 1955      MRN: [id number]  Encounter Provider: Todd Quintanilla DO  Encounter Date: 2025   Encounter department: [facility]    Assessment & Plan  Medicare annual wellness visit, subsequent  Reviewed the AWV questionnaire.  Went through standard need for appropriate screening tests based on risk factors..  Reviewed cognitive issues.  Reviewed living will and DURABLE POWER OF .  Discussed healthy lifestyle, healthy diet.  Reviewed vaccines.  Encourage exercise.  Discussed safety issues within the home and about.        Other hyperlipidemia  Currently not on a statin, her risk factor for 10-year coronary or triple vascular event is about 6% without a statin, 5% with a statin.       Hiatal hernia with GERD without esophagitis  Remains on omeprazole, follows with GI.         Depression Screening and Follow-up Plan: Patient was screened for depression during today's encounter. They screened negative with a PHQ-2 score of 0.      Urinary Incontinence Plan of Care: counseling topics discussed: practice Kegel (pelvic floor strengthening) exercises, use restroom every 2 hours, limit alcohol, caffeine, spicy foods, and acidic foods and weight loss.       Preventive health issues were discussed with patient, and age appropriate screening tests were ordered as noted in patient's After Visit Summary. Personalized health advice and appropriate referrals for health education or preventive services given if needed, as noted in patient's After Visit Summary.    History of Present Illness     Patient presents for their annual medical wellness visit.  Reviewed medical intake questionnaire.  Discussed living will and DURABLE POWER OF .  Discussed importance of these 2 documents.  Reviewed the various screening modalities the patient was due for.  Reviewed home safety as well as depression and risk of falling.  Through shared medical decision making move  forward with testing or blood work as ordered.  The patient presents with no other complaints.  She been traveling quite a bit.  Has been [facility], as well as [place] recently.    Denies any chest pain, no shortness of breath no nausea or vomiting.  Up-to-date on her usual screening studies.  Gets yearly mammograms.  Recent blood work reviewed..        Patient Care Team:  Todd Quintanilla DO as PCP - General (Emergency Medicine)  Chato Catherine DO (Gastroenterology)  Tommie Hair DO (Cardiology)    Review of Systems   Constitutional:  Positive for unexpected weight change. Negative for chills and fever.   HENT:  Negative for congestion and sore throat.    Eyes:  Negative for visual disturbance.   Respiratory:  Negative for cough and shortness of breath.    Cardiovascular:  Negative for chest pain and palpitations.   Gastrointestinal:  Negative for abdominal pain and vomiting.   Genitourinary:  Negative for dysuria and hematuria.   Musculoskeletal:  Positive for arthralgias. Negative for back pain.   Skin:  Negative for color change and rash.   Neurological:  Negative for seizures and syncope.   All other systems reviewed and are negative.    Medical History Reviewed by provider this encounter:       Annual Wellness Visit Questionnaire   Lily HYATT is here for her Subsequent Wellness visit. Last Medicare Wellness visit information reviewed, patient interviewed and updates made to the record today.      Health Risk Assessment:   Patient rates overall health as very good. Patient feels that their physical health rating is slightly better. Patient is very satisfied with their life. Eyesight was rated as same. Hearing was rated as same. Patient feels that their emotional and mental health rating is much better. Patients states they are never, rarely angry. Patient states they are sometimes unusually tired/fatigued. Pain experienced in the last 7 days has been none. Patient states that she has experienced  weight loss or gain in last 6 months.     Depression Screening:   PHQ-2 Score: 0      Fall Risk Screening:   In the past year, patient has experienced: no history of falling in past year      Urinary Incontinence Screening:   Patient has leaked urine accidently in the last six months.     Home Safety:  Patient does not have trouble with stairs inside or outside of their home. Patient has working smoke alarms and has working carbon monoxide detector. Home safety hazards include: none.     Nutrition:   Current diet is Regular.     Medications:   Patient is currently taking over-the-counter supplements. OTC medications include: see medication list. Patient is able to manage medications.     Activities of Daily Living (ADLs)/Instrumental Activities of Daily Living (IADLs):   Walk and transfer into and out of bed and chair?: Yes  Dress and groom yourself?: Yes    Bathe or shower yourself?: Yes    Feed yourself? Yes  Do your laundry/housekeeping?: Yes  Manage your money, pay your bills and track your expenses?: Yes  Make your own meals?: Yes    Do your own shopping?: Yes    Previous Hospitalizations:   Any hospitalizations or ED visits within the last 12 months?: No      Hospitalization Comments: Urgent Care fot [facility] Bronchitis    Advance Care Planning:   Living will: Yes    Durable POA for healthcare: Yes    Advanced directive: Yes    Advanced directive counseling given: Yes    ACP document given: Yes    Patient declined ACP directive: No    End of Life Decisions reviewed with patient: Yes    Provider agrees with end of life decisions: Yes      Cognitive Screening:   Provider or family/friend/caregiver concerned regarding cognition?: No    PREVENTIVE SCREENINGS      Cardiovascular Screening:    General: Screening Not Indicated and History Lipid Disorder      Diabetes Screening:     General: Screening Current      Colorectal Cancer Screening:     General: Screening Current      Breast Cancer Screening:     General:  Screening Current      Cervical Cancer Screening:    General: Screening Not Indicated      Osteoporosis Screening:    General: Screening Not Indicated and History Osteoporosis      Abdominal Aortic Aneurysm (AAA) Screening:        General: Screening Not Indicated      Lung Cancer Screening:     General: Screening Not Indicated      Hepatitis C Screening:    General: Screening Not Indicated and History Hepatitis C    Screening, Brief Intervention, and Referral to Treatment (SBIRT)     Screening  Typical number of drinks in a day: 0  Typical number of drinks in a week: 1  Interpretation: Low risk drinking behavior.    AUDIT-C Screenin) How often did you have a drink containing alcohol in the past year? 4 or more times a week  2) How many drinks did you have on a typical day when you were drinking in the past year? 1 to 2  3) How often did you have 6 or more drinks on one occasion in the past year? never    AUDIT-C Score: 4  Interpretation: Score 3-12 (female): POSITIVE screen for alcohol misuse    AUDIT Screenin) How often during the last year have you found that you were not able to stop drinking once you had started? 0 - never  5) How often during the last year have you failed to do what was normally expected from you because of drinking? 0 - never  6) How often during the last year have you needed a first drink in the morning to get yourself going after a heavy drinking session? 0 - never  7) How often during the last year have you had a feeling of guilt or remorse after drinking? 0 - never  8) How often during the last year have you been unable to remember what happened the night before because you had been drinking? 0 - never  9) Have you or someone else been injured as a result of your drinking? 0 - no  10) Has a relative or friend or a doctor or another health worker been concerned about your drinking or suggested you cut down? 0 - no    AUDIT Score: 4  Interpretation: Low risk alcohol  "consumption    Single Item Drug Screening:  How often have you used an illegal drug (including marijuana) or a prescription medication for non-medical reasons in the past year? never    Single Item Drug Screen Score: 0  Interpretation: Negative screen for possible drug use disorder    Other Counseling Topics:   Car/seat belt/driving safety, sunscreen and calcium and vitamin D intake.     Social Drivers of Health     Financial Resource Strain: Low Risk  (2/13/2024)    Overall Financial Resource Strain (CARDIA)    • Difficulty of Paying Living Expenses: Not hard at all   Food Insecurity: No Food Insecurity (2/24/2025)    Hunger Vital Sign    • Worried About Running Out of Food in the Last Year: Never true    • Ran Out of Food in the Last Year: Never true   Transportation Needs: No Transportation Needs (2/24/2025)    PRAPARE - Transportation    • Lack of Transportation (Medical): No    • Lack of Transportation (Non-Medical): No   Housing Stability: Low Risk  (2/24/2025)    Housing Stability Vital Sign    • Unable to Pay for Housing in the Last Year: No    • Number of Times Moved in the Last Year: 0    • Homeless in the Last Year: No   Utilities: Not At Risk (2/24/2025)    [facility] Utilities    • Threatened with loss of utilities: No     No results found.    Objective   /78 (BP Location: Left arm, Patient Position: Sitting, Cuff Size: Adult)   Pulse 86   Temp 98.1 °F (36.7 °C) (Tympanic)   Ht 5' 7.5\" (1.715 m)   Wt 74.8 kg (164 lb 12.8 oz)   SpO2 96%   BMI 25.43 kg/m²     Physical Exam  Vitals and nursing note reviewed.   Constitutional:       General: She is not in acute distress.     Appearance: Normal appearance. She is well-developed.   HENT:      Head: Normocephalic and atraumatic.      Nose: No congestion.   Eyes:      Conjunctiva/sclera: Conjunctivae normal.   Cardiovascular:      Rate and Rhythm: Normal rate and regular rhythm.      Pulses: Normal pulses.      Heart sounds: Normal heart sounds. No murmur " heard.  Pulmonary:      Effort: Pulmonary effort is normal. No respiratory distress.      Breath sounds: Normal breath sounds.   Abdominal:      Palpations: Abdomen is soft.      Tenderness: There is no abdominal tenderness.   Musculoskeletal:         General: No swelling.      Cervical back: Neck supple.   Skin:     General: Skin is warm and dry.      Capillary Refill: Capillary refill takes less than 2 seconds.   Neurological:      General: No focal deficit present.      Mental Status: She is alert and oriented to person, place, and time.   Psychiatric:         Mood and Affect: Mood normal.

## 2025-03-25 ENCOUNTER — ANESTHESIA (OUTPATIENT)
Dept: GASTROENTEROLOGY | Facility: HOSPITAL | Age: 70
End: 2025-03-25
Payer: MEDICARE

## 2025-03-25 ENCOUNTER — ANESTHESIA EVENT (OUTPATIENT)
Dept: GASTROENTEROLOGY | Facility: HOSPITAL | Age: 70
End: 2025-03-25
Payer: MEDICARE

## 2025-03-25 ENCOUNTER — HOSPITAL ENCOUNTER (OUTPATIENT)
Dept: GASTROENTEROLOGY | Facility: HOSPITAL | Age: 70
Setting detail: OUTPATIENT SURGERY
Discharge: HOME | End: 2025-03-25
Attending: INTERNAL MEDICINE
Payer: MEDICARE

## 2025-03-25 VITALS
RESPIRATION RATE: 16 BRPM | HEART RATE: 72 BPM | TEMPERATURE: 98.5 F | DIASTOLIC BLOOD PRESSURE: 54 MMHG | SYSTOLIC BLOOD PRESSURE: 92 MMHG | OXYGEN SATURATION: 97 %

## 2025-03-25 DIAGNOSIS — K62.3 RECTAL PROLAPSE: Primary | ICD-10-CM

## 2025-03-25 DIAGNOSIS — Z86.0100 HISTORY OF COLON POLYPS: ICD-10-CM

## 2025-03-25 PROCEDURE — 88305 TISSUE EXAM BY PATHOLOGIST: CPT | Performed by: STUDENT IN AN ORGANIZED HEALTH CARE EDUCATION/TRAINING PROGRAM

## 2025-03-25 RX ORDER — PROPOFOL 10 MG/ML
INJECTION, EMULSION INTRAVENOUS AS NEEDED
Status: DISCONTINUED | OUTPATIENT
Start: 2025-03-25 | End: 2025-03-25

## 2025-03-25 RX ORDER — PROPOFOL 10 MG/ML
INJECTION, EMULSION INTRAVENOUS CONTINUOUS PRN
Status: DISCONTINUED | OUTPATIENT
Start: 2025-03-25 | End: 2025-03-25

## 2025-03-25 RX ORDER — SODIUM CHLORIDE, SODIUM LACTATE, POTASSIUM CHLORIDE, CALCIUM CHLORIDE 600; 310; 30; 20 MG/100ML; MG/100ML; MG/100ML; MG/100ML
125 INJECTION, SOLUTION INTRAVENOUS CONTINUOUS
Status: DISCONTINUED | OUTPATIENT
Start: 2025-03-25 | End: 2025-03-29 | Stop reason: HOSPADM

## 2025-03-25 RX ORDER — LIDOCAINE HYDROCHLORIDE 20 MG/ML
INJECTION, SOLUTION EPIDURAL; INFILTRATION; INTRACAUDAL; PERINEURAL AS NEEDED
Status: DISCONTINUED | OUTPATIENT
Start: 2025-03-25 | End: 2025-03-25

## 2025-03-25 RX ADMIN — SODIUM CHLORIDE, SODIUM LACTATE, POTASSIUM CHLORIDE, AND CALCIUM CHLORIDE 125 ML/HR: .6; .31; .03; .02 INJECTION, SOLUTION INTRAVENOUS at 07:29

## 2025-03-25 RX ADMIN — PROPOFOL 30 MG: 10 INJECTION, EMULSION INTRAVENOUS at 08:08

## 2025-03-25 RX ADMIN — PROPOFOL 130 MCG/KG/MIN: 10 INJECTION, EMULSION INTRAVENOUS at 08:06

## 2025-03-25 RX ADMIN — LIDOCAINE HYDROCHLORIDE 100 MG: 20 INJECTION, SOLUTION EPIDURAL; INFILTRATION; INTRACAUDAL at 08:04

## 2025-03-25 RX ADMIN — PROPOFOL 100 MG: 10 INJECTION, EMULSION INTRAVENOUS at 08:05

## 2025-03-25 NOTE — DISCHARGE INSTR - AVS FIRST PAGE
Repeat colonoscopy in 3 years, due: 3/24/2028  Personal history of colon polyps     Suggest high-fiber diet, try obtain 25 to 30 g of fiber in diet daily.  Suggest the addition of a fiber supplement such as Benefiber 2 teaspoonfuls or Citrucel 1 heaping tablespoonful mixed in 6 to 8 ounce of non-carbonated liquid daily  -Please call the Gastroenterology office at [phone] for biopsy results if you do  not hear from our office in 14 days.  -Follow-up with primary care doctor as previously scheduled  -Please call gastroenterology physician on call or go to the emergency department if you develop abdominal pain, rectal bleeding greater than 1 tbsp, black stools, fever greater than 100.5, persistent nausea or vomiting.  Gastroenterology office phone number is [phone].    Follow-up with me in about 8 or 9 months.  If you notice a bulge around the anal region, consider colorectal surgical consultation as I suspect he may have a mild rectal prolapse.  Try to avoid constipation and straining.

## 2025-03-25 NOTE — ANESTHESIA POSTPROCEDURE EVALUATION
Post-Op Assessment Note    CV Status:  Stable  Pain Score: 0    Pain management: adequate       Mental Status:  Alert   Hydration Status:  Stable   PONV Controlled:  None   Airway Patency:  Patent  Two or more mitigation strategies used for obstructive sleep apnea   Post Op Vitals Reviewed: Yes    No anethesia notable event occurred.    Staff: CRNA           Last Filed PACU Vitals:  Vitals Value Taken Time   Temp 98.5    Pulse 70    BP 96/51    Resp 18    SpO2 98

## 2025-03-25 NOTE — ANESTHESIA PREPROCEDURE EVALUATION
Procedure:  COLONOSCOPY    Relevant Problems   ANESTHESIA (within normal limits)      CARDIO   (+) Other hyperlipidemia      ENDO (within normal limits)      GI/HEPATIC   (+) Fatty liver   (+) Gastroesophageal reflux disease with esophagitis without hemorrhage   (+) Hiatal hernia with GERD without esophagitis      /RENAL (within normal limits)      GYN (within normal limits)      HEMATOLOGY (within normal limits)      MUSCULOSKELETAL   (+) Hiatal hernia with GERD without esophagitis      NEURO/PSYCH (within normal limits)      PULMONARY (within normal limits)        Physical Exam    Airway    Mallampati score: II  TM Distance: >3 FB  Neck ROM: full     Dental   No notable dental hx     Cardiovascular  Cardiovascular exam normal    Pulmonary  Pulmonary exam normal     Other Findings  post-pubertal.      Anesthesia Plan  ASA Score- 2     Anesthesia Type- IV sedation with anesthesia with ASA Monitors.         Additional Monitors:     Airway Plan:            Plan Factors-Exercise tolerance (METS): >4 METS.    Chart reviewed. EKG reviewed.  Existing labs reviewed. Patient summary reviewed.    Patient is not a current smoker.              Induction- intravenous.    Postoperative Plan-     Perioperative Resuscitation Plan - Level 1 - Full Code.       Informed Consent- Anesthetic plan and risks discussed with patient.        NPO Status:  Vitals Value Taken Time   Date of last liquid 03/25/25 03/25/25 0722   Time of last liquid 0200 03/25/25 0722   Date of last solid 03/23/25 03/25/25 0722   Time of last solid 1800 03/25/25 0722     Discussed IV Sedation with General Anesthesia as backup with patient including but not limited to risk of cardiac insult, pulmonary complication, stroke, reaction to medications and death. All questions answered and consent was obtained.

## 2025-03-25 NOTE — H&P
History and Physical - [facility]  Lily Zhang 69 y.o. female MRN: [id number]                  HPI: Lily Zhang is a 69 y.o. year old female who presents for colonoscopy.  At time of last colonoscopy patient is a 4 mm polyp in rectum that was a tubular adenoma.  She had a 12 mm polyp in hepatic flexure that was a tubular adenoma 8 mm polyp in the mid ascending colon that was a tubular adenoma.  There is a 6 mm polyp distal ascending colon.  She presents today for surveillance colonoscopy.    She does have a history of GERD.  Well-controlled omeprazole 40 mg in the morning and famotidine 40 mg in the evening.  No dysphagia    Denies bleeding or black stools.    No family history colon cancer or polyps  Paternal Uncle  of some form of GI cancer possibly gastric            REVIEW OF SYSTEMS: Per the HPI, and otherwise unremarkable.    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     Breast cyst     Colon polyp     Diverticulosis     Fibrocystic breast     GERD (gastroesophageal reflux disease)     Hepatitis C     Lactose intolerance 1 year ago    Positive colorectal cancer screening using Cologuard test 2022     Past Surgical History:   Procedure Laterality Date    BREAST BIOPSY Bilateral benign 20yrs ago    BREAST CYST ASPIRATION      BREAST CYST EXCISION      COLONOSCOPY       Social History   Social History     Substance and Sexual Activity   Alcohol Use Not Currently    Comment: social     Social History     Substance and Sexual Activity   Drug Use Never     Social History     Tobacco Use   Smoking Status Former    Current packs/day: 0.00    Average packs/day: 1 pack/day for 8.3 years (8.3 ttl pk-yrs)    Types: Cigarettes    Start date: 1975    Quit date: 9/10/1983    Years since quittin.5    Passive exposure: Past   Smokeless Tobacco Never     Family History   Problem Relation Age of Onset    Breast cancer  Mother 75    Hypertension Father     Heart disease Father     Diabetes Father     COPD Father     Ulcerative colitis Father     No Known Problems Maternal Grandmother     No Known Problems Paternal Grandmother     No Known Problems Maternal Aunt        Meds/Allergies       Current Outpatient Medications:     famotidine (PEPCID) 40 MG tablet    metroNIDAZOLE (METROGEL) 0.75 % gel    omeprazole (PriLOSEC) 40 MG capsule    benzonatate (TESSALON) 200 MG capsule    Denosumab (PROLIA SC)    Current Facility-Administered Medications:     lactated ringers infusion, 125 mL/hr, Intravenous, Continuous, 125 mL/hr at 03/25/25 0729    Allergies   Allergen Reactions    Tetracycline Rash       Objective     /74   Pulse 84   Temp (!) 97.2 °F (36.2 °C) (Temporal)   Resp 18   SpO2 98%       PHYSICAL EXAM    Gen: NAD  Head: NCAT  CV: RRR  CHEST: Clear  ABD: soft, NT/ND  EXT: no edema      ASSESSMENT/PLAN:  This is a 69 y.o. year old female here for colonoscopy, and she is stable and optimized for her procedure.

## 2025-03-27 PROCEDURE — 88305 TISSUE EXAM BY PATHOLOGIST: CPT | Performed by: STUDENT IN AN ORGANIZED HEALTH CARE EDUCATION/TRAINING PROGRAM

## 2025-03-28 ENCOUNTER — RESULTS FOLLOW-UP (OUTPATIENT)
Dept: GASTROENTEROLOGY | Facility: CLINIC | Age: 70
End: 2025-03-28

## 2025-03-28 NOTE — RESULT ENCOUNTER NOTE
Please recall for repeat colonoscopy in 3 years.  Please arrange office follow-up with me for about 8 or 9 months.    I sent patient [employer] message stating that;  Dear Lily, I am writing to review biopsy results from recent colonoscopy.  All 4 polyps were benign but precancerous polyps that were completely removed.  1 was extremely subtle.  Based upon these findings I would recommend repeating colonoscopy in 3 years.  I would like to see you in the office in follow-up in about 8 or 9 months.  Please let me know if you would like me to place an order for colorectal surgical consultation from the standpoint of the rectal prolapse, if I did not already do that.  Please feel free to reach out if you have any questions or concerns.  Best regards,  Dr. Catherine

## 2025-03-31 NOTE — TELEPHONE ENCOUNTER
Pt returned call and was scheduled for a FU on Oct 16th. She also wanted to let the Dr know she has scheduled a Consult with [facility] for Dr.Evazzadahs dimas available on 05.23.25 so she will be following recommendations there and let the Dr know how it goes.

## 2025-04-03 ENCOUNTER — OFFICE VISIT (OUTPATIENT)
Age: 70
End: 2025-04-03
Payer: MEDICARE

## 2025-04-03 ENCOUNTER — TELEPHONE (OUTPATIENT)
Dept: FAMILY MEDICINE CLINIC | Facility: CLINIC | Age: 70
End: 2025-04-03

## 2025-04-03 VITALS — HEIGHT: 68 IN | BODY MASS INDEX: 25.01 KG/M2 | WEIGHT: 165 LBS

## 2025-04-03 DIAGNOSIS — M81.0 OSTEOPOROSIS, UNSPECIFIED OSTEOPOROSIS TYPE, UNSPECIFIED PATHOLOGICAL FRACTURE PRESENCE: Primary | ICD-10-CM

## 2025-04-03 DIAGNOSIS — K62.3 RECTAL PROLAPSE: Primary | ICD-10-CM

## 2025-04-03 PROCEDURE — 99204 OFFICE O/P NEW MOD 45 MIN: CPT | Performed by: COLON & RECTAL SURGERY

## 2025-04-03 PROCEDURE — 46600 DIAGNOSTIC ANOSCOPY SPX: CPT | Performed by: COLON & RECTAL SURGERY

## 2025-04-03 NOTE — ASSESSMENT & PLAN NOTE
69-year-old female, 2 vaginal deliveries, both including episiotomy by her report which is consistent with examination.  She has question of multicompartment prolapse, there is uterine/vaginal prolapse into the introitus on straining.  Mild anterior attenuation, decreased perineal body, no overt mucosal/rectal prolapse.  Sphincter tone weak to fair, fair push, minimal squeeze  Normal anoscopy    High fiber diet/increased hydration, 20-30grams fiber per day, increased fruits/vegetables/psyllium(metamucil or konsyl 1 tbsp 1-2x/day)     Pelvic floor PT referral placed  6months follow-up with Dr. Marie to evaluate for any multicompartment workup or robotic operative intervention as needed  CC:  Todd Quintanilla, DO Dr. Catherine    Orders:    Ambulatory Referral to Colorectal Surgery    Ambulatory Referral to Physical Therapy; Future

## 2025-04-03 NOTE — PROGRESS NOTES
"Name: Lily Zhang      : 1955      MRN: [id number]  Encounter Provider: Brenden Love MD  Encounter Date: 4/3/2025   Encounter department: [facility] [place]  :  Assessment & Plan  Rectal prolapse  69-year-old female, 2 vaginal deliveries, both including episiotomy by her report which is consistent with examination.  She has question of multicompartment prolapse, there is uterine/vaginal prolapse into the introitus on straining.  Mild anterior attenuation, decreased perineal body, no overt mucosal/rectal prolapse.  Sphincter tone weak to fair, fair push, minimal squeeze  Normal anoscopy    High fiber diet/increased hydration, 20-30grams fiber per day, increased fruits/vegetables/psyllium(metamucil or konsyl 1 tbsp 1-2x/day)     Pelvic floor PT referral placed  6months follow-up with Dr. Marie to evaluate for any multicompartment workup or robotic operative intervention as needed  CC:  Todd Quintanilla, DO Dr. Catherine    Orders:  •  Ambulatory Referral to Colorectal Surgery  •  Ambulatory Referral to Physical Therapy; Future           History of Present Illness   HPI    Lily Zhang is here today for evaluation of rectal prolapse. She admits to dull aching rectal pain in the middle of night. She states she can feel a small bulge at the rectum when straining on a bowel movement. She has a history 2 vaginal births.     She had a colonoscopy on 3/25/25 with Dr. Catherine which revealed probable mild rectal prolapse, diverticulosis in sigmoid colon, sessile serrated x2, tubular adenoma x2, recommended 5 year recall.     Review of Systems    Objective   Ht 5' 7.5\" (1.715 m)   Wt 74.8 kg (165 lb)   BMI 25.46 kg/m²      Physical Exam  Eyes:      Pupils: Pupils are equal, round, and reactive to light.   Pulmonary:      Effort: Pulmonary effort is normal.   Abdominal:      Palpations: Abdomen is soft.   Genitourinary:     Rectum: Normal.      Comments: She has question " of multicompartment prolapse, there is uterine/vaginal prolapse into the introitus on straining.  Mild anterior attenuation, decreased perineal body, no overt mucosal/rectal prolapse.  Neurological:      Mental Status: She is alert.   Psychiatric:         Mood and Affect: Mood normal.     Anoscopy    Date/Time: 4/3/2025 9:30 AM    Performed by: Brenden Love MD  Authorized by: Brenden Love MD    Verbal consent obtained?: Yes    Consent given by:  Patient  Indications: rectal irritation    Scope type:  Anoscope   Normal anorectal mucosa.

## 2025-04-08 ENCOUNTER — CLINICAL SUPPORT (OUTPATIENT)
Dept: FAMILY MEDICINE CLINIC | Facility: CLINIC | Age: 70
End: 2025-04-08
Payer: MEDICARE

## 2025-04-08 DIAGNOSIS — M81.0 AGE-RELATED OSTEOPOROSIS WITHOUT CURRENT PATHOLOGICAL FRACTURE: Primary | ICD-10-CM

## 2025-04-08 PROCEDURE — 96372 THER/PROPH/DIAG INJ SC/IM: CPT | Performed by: INTERNAL MEDICINE

## 2025-05-25 DIAGNOSIS — R07.89 CHEST PRESSURE: ICD-10-CM

## 2025-05-27 RX ORDER — FAMOTIDINE 40 MG/1
TABLET, FILM COATED ORAL
Qty: 90 TABLET | Refills: 1 | Status: SHIPPED | OUTPATIENT
Start: 2025-05-27

## 2025-06-02 ENCOUNTER — EVALUATION (OUTPATIENT)
Dept: PHYSICAL THERAPY | Facility: HOME HEALTHCARE | Age: 70
End: 2025-06-02
Payer: MEDICARE

## 2025-06-02 DIAGNOSIS — K62.3 RECTAL PROLAPSE: Primary | ICD-10-CM

## 2025-06-02 PROCEDURE — 97112 NEUROMUSCULAR REEDUCATION: CPT | Performed by: PHYSICAL THERAPIST

## 2025-06-02 PROCEDURE — 97162 PT EVAL MOD COMPLEX 30 MIN: CPT | Performed by: PHYSICAL THERAPIST

## 2025-06-02 PROCEDURE — 97530 THERAPEUTIC ACTIVITIES: CPT | Performed by: PHYSICAL THERAPIST

## 2025-06-02 NOTE — PROGRESS NOTES
PT Evaluation     Today's date: 2025  Patient name: Lily Zhang  : 1955  MRN: [id number]  Referring provider: Brenden Love MD  Dx:   Encounter Diagnosis     ICD-10-CM    1. Rectal prolapse  K62.3                      Assessment  Impairments: abnormal coordination, abnormal muscle tone, impaired physical strength, lacks appropriate home exercise program, activity limitations and endurance  Other impairment: pelvic floor weakness  Functional limitations: urinary and fecal incontinence    Assessment details: Pt Lily Zhang is a 69 y.o. who presents to OPPT with s/s consistent with pelvic organ prolapse contributing to pelvic floor weakness, pelvic floor discomfort, NANETTE, and fecal incontinence. PT will address the noted impairments by performing pelvic floor and hip strengthening, stretching, breathing exercises, biofeedback, functional activities and manual techniques to allow the patient to return to her PLOF.       Goals  *Pt to be indeSTGs to be met in 4 weeks:  * Pt will demonstrate correct isolation, contraction, and relaxation of pelvic floor without overflow from global stabilizers.   * Pt will demonstrate correct diaphragmatic breathing in order to decrease muscle over-activity and improve intra-abdominal pressure gradients.   * Pt will report a decrease in urinary/fecal leakage by 25% for improved tolerance to daily activities .   LTGs to be met by discharge:  * Normalize findings on sEMG to indicate PFM strength average of at least 12uV and resting average < 2.5uV.  * Implements relaxation strategies on a daily basis.  * Patient will report 90% reduction in urinary /fecal leakage .  * Patient will be compliant with comprehensive home exercise program for self management of condition       Plan  Patient would benefit from: skilled physical therapy and women's health eval  Planned modality interventions: biofeedback    Planned therapy interventions: abdominal trunk stabilization,  manual therapy, breathing training, neuromuscular re-education, patient/caregiver education, coordination, home exercise program, functional ROM exercises, flexibility, therapeutic activities and therapeutic exercise    Frequency: 1x week  Duration in weeks: 12  Plan of Care beginning date: 2025  Plan of Care expiration date: 2025  Treatment plan discussed with: patient and referring physician        PT Pelvic Floor Subjective:   History of Present Illness:   Pt reporting that she is seeing colorectal who has referred to PFPT due to rectal prolapse. Pt goes on to report that she also saw GYN who feels she may have bladder/uterine prolapse as well. She notes mild discomfort in the pelvic region, as well as intermittent urinary and fecal incontinence.  She will return to MD again in November and if symptoms persist may have surgical procedure to help correct prolapse. Quality of life: good    Social Support:     Lives with:  Alone    Work status: retiredPronouns: she/her  Bladder Function:      Voiding Difficulties comments:     Voiding frequency: every 1-2 hours    Urinary leakage: urine leakage    Urinary leakage aggravated by: coughing, walking to the bathroom and laughing    Nocturia (episodes per night): 0    Painful urination: No      Fluid Intake Type:  Water and coffee    Intake (ounces):     Intake (ounces) comment: Regular coffee and tea in AM  Incontinence Management:     Pads/Diaper Use:  Day  Bowel Function:     Bowel frequency: multiple times a day    Fajardo Stool Scale: type 6 and type 5  Sexual Function:     Sexually Active:  Not sexually active  Pain:     At best pain ratin    At worst pain ratin  Treatments:     Previous treatment:  Physical therapy  Patient Goals:     Patient goals for therapy:  Improved comfort, improved bladder or bowel function, improved pain management, improved quality of life and relaxation      Objective     Neurological Testing     Sensation     Lumbar    Left   Intact: light touch    Right   Intact: light touch    Active Range of Motion     Lumbar   Normal active range of motion    General Comments:      Hip Comments   B LE ROM grossly WFL     B LE MMT grossly 4-/5 to 4/5      Abdominal Assessment:          General Perineum Exam:     General perineum exam comments: Reviewed pelvic exam - upcoming with pt consent                  Precautions: Prolapse      Visit Count 1           Manuals 6/2        Pelvic exam                                 Neuro Re-Ed         DB  Reviewed IA pressure gradients and needs for 'exhale and contract' with HEP         PF  Intro to PF HEP        TA admin                                         Ther Ex        SLR        Bridges         Hip add        S/L SLR        Clamshells                                 Ther Activity        Pelvic floor  Anatomy and function; exam technique and rational       Healthy bladder         Urge deferral                 Gait Training                        Modalities

## 2025-06-03 ENCOUNTER — HOSPITAL ENCOUNTER (OUTPATIENT)
Dept: ULTRASOUND IMAGING | Facility: HOSPITAL | Age: 70
Discharge: HOME | End: 2025-06-03
Attending: INTERNAL MEDICINE
Payer: MEDICARE

## 2025-06-03 DIAGNOSIS — K76.0 FATTY LIVER: ICD-10-CM

## 2025-06-03 PROCEDURE — 76981 USE PARENCHYMA: CPT

## 2025-06-12 ENCOUNTER — OFFICE VISIT (OUTPATIENT)
Dept: PHYSICAL THERAPY | Facility: HOME HEALTHCARE | Age: 70
End: 2025-06-12
Attending: COLON & RECTAL SURGERY
Payer: MEDICARE

## 2025-06-12 DIAGNOSIS — K62.3 RECTAL PROLAPSE: Primary | ICD-10-CM

## 2025-06-12 PROCEDURE — 97110 THERAPEUTIC EXERCISES: CPT | Performed by: PHYSICAL THERAPIST

## 2025-06-12 PROCEDURE — 97112 NEUROMUSCULAR REEDUCATION: CPT | Performed by: PHYSICAL THERAPIST

## 2025-06-12 NOTE — PROGRESS NOTES
"Daily Note     Today's date: 2025  Patient name: Lily Zhang  : 1955  MRN: [id number]  Referring provider: Brenden Love MD  Dx:   Encounter Diagnosis     ICD-10-CM    1. Rectal prolapse  K62.3                      Subjective: Pt reporting that she feels like she understands how to complete the pelvic contraction well.       Objective: See treatment diary below      Assessment: Introduction to hip/core strengthening without difficulties; mild mm fatigue as appropriate.  Good coordination of PF mm with hip adduction. Will assess response to updated HEP next session and continue with POC as tolerable.     Plan: Continue per plan of care.      Precautions: Prolapse      Visit Count 1 2          Manuals       Pelvic exam                                 Neuro Re-Ed         DB  Reviewed IA pressure gradients and needs for 'exhale and contract' with HEP         PF  Intro to PF HEP  Supine endurance holds x 10\"     PF with hip add   Supine x 10         TA admin   Supine   10\" x 10                                       Ther Ex        SLR  X 15 stacie      Bridges   X 15       Hip add        S/L SLR        Clamshells                                 Ther Activity        Pelvic floor  Anatomy and function; exam technique and rational       Healthy bladder         Urge deferral                 Gait Training                        Modalities                                       "

## 2025-06-16 ENCOUNTER — APPOINTMENT (OUTPATIENT)
Dept: PHYSICAL THERAPY | Facility: HOME HEALTHCARE | Age: 70
End: 2025-06-16
Attending: COLON & RECTAL SURGERY
Payer: MEDICARE

## 2025-06-23 ENCOUNTER — OFFICE VISIT (OUTPATIENT)
Dept: PHYSICAL THERAPY | Facility: HOME HEALTHCARE | Age: 70
End: 2025-06-23
Attending: COLON & RECTAL SURGERY
Payer: MEDICARE

## 2025-06-23 DIAGNOSIS — K62.3 RECTAL PROLAPSE: Primary | ICD-10-CM

## 2025-06-23 PROCEDURE — 97110 THERAPEUTIC EXERCISES: CPT | Performed by: PHYSICAL THERAPIST

## 2025-06-23 PROCEDURE — 97112 NEUROMUSCULAR REEDUCATION: CPT | Performed by: PHYSICAL THERAPIST

## 2025-06-23 NOTE — PROGRESS NOTES
"Daily Note     Today's date: 2025  Patient name: Lily Zhang  : 1955  MRN: [id number]  Referring provider: Brenden Love MD  Dx:   Encounter Diagnosis     ICD-10-CM    1. Rectal prolapse  K62.3                      Subjective: Pt reporting that she is seeing some improvement so far.       Objective: See treatment diary below      Assessment: Good tolerance to progression of program. PF weakness noted with supine to seated positioning for endurance holds as appropriate; encouraged good breath work with PF exercises for HEP.  NuStep for generalized conditioning due to B LE weakness.     Plan: Continue per plan of care.      Precautions: Prolapse      Visit Count 1 2 3         Manuals      Pelvic exam                                 Neuro Re-Ed         DB  Reviewed IA pressure gradients and needs for 'exhale and contract' with HEP         PF  Intro to PF HEP  Supine endurance holds x 10\"     PF with hip add   Supine x 10    Seated   Endurance hold   Max x 10\"   X 5     Pelvic floor elevators   Seated x 5      TA admin   Supine   10\" x 10       Toe taps    X 10                              Ther Ex        NuStep for conditioning    L2 10 minutes      SLR  X 15 stacie HEP     Bridges   X 15  With hip add   X 10      Hip add        S/L SLR        Clamshells    X 10 stacie                              Ther Activity        Pelvic floor  Anatomy and function; exam technique and rational       Healthy bladder         Urge deferral                 Gait Training                        Modalities                                         "

## 2025-07-02 ENCOUNTER — OFFICE VISIT (OUTPATIENT)
Dept: PHYSICAL THERAPY | Facility: HOME HEALTHCARE | Age: 70
End: 2025-07-02
Attending: COLON & RECTAL SURGERY
Payer: MEDICARE

## 2025-07-02 DIAGNOSIS — K62.3 RECTAL PROLAPSE: Primary | ICD-10-CM

## 2025-07-02 PROCEDURE — 97110 THERAPEUTIC EXERCISES: CPT | Performed by: PHYSICAL THERAPIST

## 2025-07-02 PROCEDURE — 97112 NEUROMUSCULAR REEDUCATION: CPT | Performed by: PHYSICAL THERAPIST

## 2025-07-02 NOTE — PROGRESS NOTES
"Daily Note     Today's date: 2025  Patient name: Lily Zhang  : 1955  MRN: [id number]  Referring provider: Brenden Love MD  Dx:   Encounter Diagnosis     ICD-10-CM    1. Rectal prolapse  K62.3                      Subjective: Pt reporting that she is doing well overall; had one bad day with incontinence but feels it was something she ate didn't agree with her.       Objective: See treatment diary below      Assessment: Tolerable to progression of PF activities with good ability to hold; however pt does fatigue quickly through reps. Encourage continuation of HEP in moderation to avoid overuse of PF mm and worsening of symptoms.  Continue to work on gradual progression of core program to address sx.     Plan: Continue per plan of care.      Precautions: Prolapse      Visit Count 1 2 3 4        Manuals     Pelvic exam                                 Neuro Re-Ed         DB  Reviewed IA pressure gradients and needs for 'exhale and contract' with HEP     LTP with TA holds     Green x 15     PF  Intro to PF HEP  Supine endurance holds x 10\"     PF with hip add   Supine x 10    Seated   Endurance hold   Max x 10\"   X 5     Pelvic floor elevators   Seated x 5  PF with Step up onto C step   X 5 R/L ea     Standing PF endurance for HEP       Pelvic elevators for HEP     PF with STS x 10    TA admin   Supine   10\" x 10       Toe taps    X 10                              Ther Ex        NuStep for conditioning    L2 10 minutes  L3 10 minutes     SLR  X 15 stacie HEP     Bridges   X 15  With hip add   X 10      Hip add        S/L SLR        Clamshells    X 10 stacie                              Ther Activity        Pelvic floor  Anatomy and function; exam technique and rational       Healthy bladder         Urge deferral                 Gait Training                        Modalities                                  "

## 2025-07-09 ENCOUNTER — OFFICE VISIT (OUTPATIENT)
Dept: PHYSICAL THERAPY | Facility: HOME HEALTHCARE | Age: 70
End: 2025-07-09
Attending: COLON & RECTAL SURGERY
Payer: MEDICARE

## 2025-07-09 DIAGNOSIS — K62.3 RECTAL PROLAPSE: Primary | ICD-10-CM

## 2025-07-09 PROCEDURE — 97112 NEUROMUSCULAR REEDUCATION: CPT | Performed by: PHYSICAL THERAPIST

## 2025-07-09 PROCEDURE — 97110 THERAPEUTIC EXERCISES: CPT | Performed by: PHYSICAL THERAPIST

## 2025-07-09 NOTE — PROGRESS NOTES
"Daily Note     Today's date: 2025  Patient name: Lily Zhang  : 1955  MRN: [id number]  Referring provider: Brenden Love MD  Dx:   Encounter Diagnosis     ICD-10-CM    1. Rectal prolapse  K62.3                      Subjective: Pt reporting that she has been feeling really good lately; no pressure or incontinence this past week.       Objective: See treatment diary below      Assessment: Progression of TE completed with updated HEP provided and reviewed with pt.  Pt with mild fatigue with program as appropriate for core strengthening. Will assess progress in 2 weeks.     Plan: Meet in 2 weeks; plan for DC if pts remains asymptomatic.     Precautions: Prolapse      Visit Count 1 2 3 4 5       Manuals    Pelvic exam                                 Neuro Re-Ed         DB  Reviewed IA pressure gradients and needs for 'exhale and contract' with HEP     LTP with TA holds     Green x 15  MTP/LTP   Green x 20 ea    PF  Intro to PF HEP  Supine endurance holds x 10\"     PF with hip add   Supine x 10    Seated   Endurance hold   Max x 10\"   X 5     Pelvic floor elevators   Seated x 5  PF with Step up onto C step   X 5 R/L ea     Standing PF endurance for HEP       Pelvic elevators for HEP     PF with STS x 10    TA admin   Supine   10\" x 10       Toe taps    X 10   X 10 with toe tap further out    90/90 OH lifts      X 10                    Ther Ex        NuStep for conditioning    L2 10 minutes  L3 10 minutes  L3 10 minutes    SLR  X 15 stacie HEP     Bridges   X 15  With hip add   X 10      Hip add        S/L SLR     X 20    Clamshells    X 10 stacie   X 20                            Ther Activity        Pelvic floor  Anatomy and function; exam technique and rational       Healthy bladder         Urge deferral                 Gait Training                        Modalities                                    "

## 2025-07-15 ENCOUNTER — APPOINTMENT (OUTPATIENT)
Dept: PHYSICAL THERAPY | Facility: HOME HEALTHCARE | Age: 70
End: 2025-07-15
Attending: COLON & RECTAL SURGERY
Payer: MEDICARE

## 2025-07-16 ENCOUNTER — APPOINTMENT (OUTPATIENT)
Dept: PHYSICAL THERAPY | Facility: HOME HEALTHCARE | Age: 70
End: 2025-07-16
Attending: COLON & RECTAL SURGERY
Payer: MEDICARE

## 2025-07-22 ENCOUNTER — OFFICE VISIT (OUTPATIENT)
Dept: PHYSICAL THERAPY | Facility: HOME HEALTHCARE | Age: 70
End: 2025-07-22
Attending: COLON & RECTAL SURGERY
Payer: MEDICARE

## 2025-07-22 DIAGNOSIS — K62.3 RECTAL PROLAPSE: Primary | ICD-10-CM

## 2025-07-22 PROCEDURE — 97110 THERAPEUTIC EXERCISES: CPT | Performed by: PHYSICAL THERAPIST

## 2025-07-22 NOTE — PROGRESS NOTES
PT Re-Evaluation  and PT Discharge    Today's date: 2025  Patient name: Lily Zhang  : 1955  MRN: [id number]  Referring provider: Brenden Love MD  Dx:   Encounter Diagnosis     ICD-10-CM    1. Rectal prolapse  K62.3                      Assessment    Assessment details: UPDATE:  Pt reporting significant decrease in pelvic floor pressure and demonstrating improvement in PF strength with resolution of urinary or fecal incontinence. She has met outlined goals and is pleased with her progress in therapy.  DC from OPPT at this time. Thank you.     Pt Lily Zhang is a 69 y.o. who presents to OPPT with s/s consistent with pelvic organ prolapse contributing to pelvic floor weakness, pelvic floor discomfort, NANETTE, and fecal incontinence. PT will address the noted impairments by performing pelvic floor and hip strengthening, stretching, breathing exercises, biofeedback, functional activities and manual techniques to allow the patient to return to her PLOF.       Goals  *Pt to be indeSTGs to be met in 4 weeks:  - MET   * Pt will demonstrate correct isolation, contraction, and relaxation of pelvic floor without overflow from global stabilizers.   * Pt will demonstrate correct diaphragmatic breathing in order to decrease muscle over-activity and improve intra-abdominal pressure gradients.   * Pt will report a decrease in urinary/fecal leakage by 25% for improved tolerance to daily activities .   LTGs to be met by discharge: - MET  * Normalize findings on sEMG to indicate PFM strength average of at least 12uV and resting average < 2.5uV.  * Implements relaxation strategies on a daily basis.  * Patient will report 90% reduction in urinary /fecal leakage .  * Patient will be compliant with comprehensive home exercise program for self management of condition       Plan    Treatment plan discussed with: patient and referring physician  Plan details: DC from OPPT       PT Pelvic Floor Subjective:   History  of Present Illness:   Pt reporting that she is seeing colorectal who has referred to PFPT due to rectal prolapse. Pt goes on to report that she also saw GYN who feels she may have bladder/uterine prolapse as well. She notes mild discomfort in the pelvic region, as well as intermittent urinary and fecal incontinence.  She will return to MD again in November and if symptoms persist may have surgical procedure to help correct prolapse. Quality of life: good    Social Support:     Lives with:  Alone    Work status: retiredPronouns: she/her  Bladder Function:      Voiding Difficulties comments:     Voiding frequency: every 1-2 hours    Urinary leakage: no urine leakage    Urinary leakage not aggravated by: coughing, walking to the bathroom and laughing    Nocturia (episodes per night): 0    Painful urination: No      Fluid Intake Type:  Water and coffee    Intake (ounces):     Intake (ounces) comment: Regular coffee and tea in AM  Incontinence Management:     Pads/Diaper Use:  Day  Bowel Function:     Bowel frequency: multiple times a day    Florence Stool Scale: type 6 and type 5  Sexual Function:     Sexually Active:  Not sexually active  Pain:     At best pain ratin    At worst pain ratin  Treatments:     Previous treatment:  Physical therapy  Patient Goals:     Patient goals for therapy:  Improved comfort, improved bladder or bowel function, improved pain management, improved quality of life and relaxation      Objective     Neurological Testing     Sensation     Lumbar   Left   Intact: light touch    Right   Intact: light touch    Active Range of Motion     Lumbar   Normal active range of motion    General Comments:      Hip Comments   B LE ROM grossly WFL     B LE MMT grossly 4-/5 to 4/5                Precautions: Prolapse      Visit Count 1 2 3 4 5       Manuals /   Pelvic exam                                 Neuro Re-Ed         DB  Reviewed IA pressure gradients and needs for  "'exhale and contract' with HEP     LTP with TA holds     Green x 15     PF  Intro to PF HEP  Supine endurance holds x 10\"     PF with hip add   Supine x 10    Seated   Endurance hold   Max x 10\"   X 5     Pelvic floor elevators   Seated x 5  PF with Step up onto C step   X 5 R/L ea     Standing PF endurance for HEP       Pelvic elevators for HEP     PF with STS x 10    TA admin   Supine   10\" x 10       Toe taps    X 10                              Ther Ex        NuStep for conditioning    L2 10 minutes  L3 10 minutes  L3 10 minutes    SLR  X 15 stacie HEP  Reviewed complete HEP and appropriate weaning off schedule    Bridges   X 15  With hip add   X 10      Hip add        S/L SLR        Clamshells    X 10 stacie                              Ther Activity        Pelvic floor  Anatomy and function; exam technique and rational       Healthy bladder         Urge deferral                 Gait Training                        Modalities                                "

## 2025-07-30 DIAGNOSIS — K21.00 GASTROESOPHAGEAL REFLUX DISEASE WITH ESOPHAGITIS WITHOUT HEMORRHAGE: ICD-10-CM

## 2025-07-31 RX ORDER — OMEPRAZOLE 40 MG/1
40 CAPSULE, DELAYED RELEASE ORAL DAILY
Qty: 90 CAPSULE | Refills: 1 | Status: SHIPPED | OUTPATIENT
Start: 2025-07-31